# Patient Record
Sex: FEMALE | Race: WHITE | NOT HISPANIC OR LATINO | Employment: UNEMPLOYED | URBAN - NONMETROPOLITAN AREA
[De-identification: names, ages, dates, MRNs, and addresses within clinical notes are randomized per-mention and may not be internally consistent; named-entity substitution may affect disease eponyms.]

---

## 2020-07-02 ENCOUNTER — HOSPITAL ENCOUNTER (EMERGENCY)
Facility: OTHER | Age: 20
Discharge: HOME | End: 2020-07-02
Attending: EMERGENCY MEDICINE | Admitting: EMERGENCY MEDICINE
Payer: COMMERCIAL

## 2020-07-02 ENCOUNTER — APPOINTMENT (OUTPATIENT)
Dept: CT IMAGING | Facility: OTHER | Age: 20
End: 2020-07-02
Attending: EMERGENCY MEDICINE
Payer: COMMERCIAL

## 2020-07-02 VITALS
HEART RATE: 120 BPM | SYSTOLIC BLOOD PRESSURE: 130 MMHG | BODY MASS INDEX: 20.4 KG/M2 | TEMPERATURE: 98.9 F | HEIGHT: 67 IN | OXYGEN SATURATION: 100 % | RESPIRATION RATE: 16 BRPM | DIASTOLIC BLOOD PRESSURE: 88 MMHG | WEIGHT: 130 LBS

## 2020-07-02 DIAGNOSIS — N12 PYELONEPHRITIS: ICD-10-CM

## 2020-07-02 LAB
ALBUMIN UR-MCNC: NEGATIVE MG/DL
ANION GAP SERPL CALCULATED.3IONS-SCNC: 7 MMOL/L (ref 3–14)
APPEARANCE UR: CLEAR
BASOPHILS # BLD AUTO: 0 10E9/L (ref 0–0.2)
BASOPHILS NFR BLD AUTO: 0.3 %
BILIRUB UR QL STRIP: NEGATIVE
BUN SERPL-MCNC: 9 MG/DL (ref 7–25)
CALCIUM SERPL-MCNC: 9.8 MG/DL (ref 8.6–10.3)
CHLORIDE SERPL-SCNC: 102 MMOL/L (ref 98–107)
CO2 SERPL-SCNC: 28 MMOL/L (ref 21–31)
COLOR UR AUTO: ABNORMAL
CREAT SERPL-MCNC: 0.79 MG/DL (ref 0.6–1.2)
DIFFERENTIAL METHOD BLD: NORMAL
EOSINOPHIL # BLD AUTO: 0 10E9/L (ref 0–0.7)
EOSINOPHIL NFR BLD AUTO: 0.4 %
ERYTHROCYTE [DISTWIDTH] IN BLOOD BY AUTOMATED COUNT: 13.1 % (ref 10–15)
GFR SERPL CREATININE-BSD FRML MDRD: >90 ML/MIN/{1.73_M2}
GLUCOSE SERPL-MCNC: 96 MG/DL (ref 70–105)
GLUCOSE UR STRIP-MCNC: NEGATIVE MG/DL
HCG UR QL: NEGATIVE
HCT VFR BLD AUTO: 36.3 % (ref 35–47)
HGB BLD-MCNC: 12.1 G/DL (ref 11.7–15.7)
HGB UR QL STRIP: NEGATIVE
IMM GRANULOCYTES # BLD: 0 10E9/L (ref 0–0.4)
IMM GRANULOCYTES NFR BLD: 0.1 %
KETONES UR STRIP-MCNC: NEGATIVE MG/DL
LEUKOCYTE ESTERASE UR QL STRIP: ABNORMAL
LYMPHOCYTES # BLD AUTO: 3 10E9/L (ref 0.8–5.3)
LYMPHOCYTES NFR BLD AUTO: 44.7 %
MCH RBC QN AUTO: 28.5 PG (ref 26.5–33)
MCHC RBC AUTO-ENTMCNC: 33.3 G/DL (ref 31.5–36.5)
MCV RBC AUTO: 86 FL (ref 78–100)
MONOCYTES # BLD AUTO: 0.5 10E9/L (ref 0–1.3)
MONOCYTES NFR BLD AUTO: 8 %
MUCOUS THREADS #/AREA URNS LPF: PRESENT /LPF
NEUTROPHILS # BLD AUTO: 3.1 10E9/L (ref 1.6–8.3)
NEUTROPHILS NFR BLD AUTO: 46.5 %
NITRATE UR QL: NEGATIVE
PH UR STRIP: 6.5 PH (ref 5–7)
PLATELET # BLD AUTO: 195 10E9/L (ref 150–450)
POTASSIUM SERPL-SCNC: 3.5 MMOL/L (ref 3.5–5.1)
RBC # BLD AUTO: 4.24 10E12/L (ref 3.8–5.2)
RBC #/AREA URNS AUTO: 2 /HPF (ref 0–2)
SODIUM SERPL-SCNC: 137 MMOL/L (ref 134–144)
SOURCE: ABNORMAL
SP GR UR STRIP: 1.01 (ref 1–1.03)
SQUAMOUS #/AREA URNS AUTO: 3 /HPF (ref 0–1)
UROBILINOGEN UR STRIP-MCNC: NORMAL MG/DL (ref 0–2)
WBC # BLD AUTO: 6.7 10E9/L (ref 4–11)
WBC #/AREA URNS AUTO: 9 /HPF (ref 0–5)

## 2020-07-02 PROCEDURE — 96365 THER/PROPH/DIAG IV INF INIT: CPT | Performed by: EMERGENCY MEDICINE

## 2020-07-02 PROCEDURE — 99284 EMERGENCY DEPT VISIT MOD MDM: CPT | Mod: 25 | Performed by: EMERGENCY MEDICINE

## 2020-07-02 PROCEDURE — 81003 URINALYSIS AUTO W/O SCOPE: CPT | Performed by: EMERGENCY MEDICINE

## 2020-07-02 PROCEDURE — 81025 URINE PREGNANCY TEST: CPT | Performed by: EMERGENCY MEDICINE

## 2020-07-02 PROCEDURE — 25800030 ZZH RX IP 258 OP 636: Performed by: EMERGENCY MEDICINE

## 2020-07-02 PROCEDURE — 25000128 H RX IP 250 OP 636: Performed by: EMERGENCY MEDICINE

## 2020-07-02 PROCEDURE — 96375 TX/PRO/DX INJ NEW DRUG ADDON: CPT | Performed by: EMERGENCY MEDICINE

## 2020-07-02 PROCEDURE — 99283 EMERGENCY DEPT VISIT LOW MDM: CPT | Mod: Z6 | Performed by: EMERGENCY MEDICINE

## 2020-07-02 PROCEDURE — 85025 COMPLETE CBC W/AUTO DIFF WBC: CPT | Performed by: EMERGENCY MEDICINE

## 2020-07-02 PROCEDURE — 74176 CT ABD & PELVIS W/O CONTRAST: CPT | Mod: TC

## 2020-07-02 PROCEDURE — 36415 COLL VENOUS BLD VENIPUNCTURE: CPT | Performed by: EMERGENCY MEDICINE

## 2020-07-02 PROCEDURE — 80048 BASIC METABOLIC PNL TOTAL CA: CPT | Performed by: EMERGENCY MEDICINE

## 2020-07-02 RX ORDER — SODIUM CHLORIDE 9 MG/ML
1000 INJECTION, SOLUTION INTRAVENOUS CONTINUOUS
Status: DISCONTINUED | OUTPATIENT
Start: 2020-07-02 | End: 2020-07-02 | Stop reason: HOSPADM

## 2020-07-02 RX ORDER — ONDANSETRON 4 MG/1
4 TABLET, ORALLY DISINTEGRATING ORAL EVERY 8 HOURS PRN
Qty: 10 TABLET | Refills: 0 | Status: SHIPPED | OUTPATIENT
Start: 2020-07-02 | End: 2021-03-24

## 2020-07-02 RX ORDER — CEFDINIR 300 MG/1
300 CAPSULE ORAL 2 TIMES DAILY
Qty: 20 CAPSULE | Refills: 0 | Status: SHIPPED | OUTPATIENT
Start: 2020-07-02 | End: 2021-03-24

## 2020-07-02 RX ORDER — CLONAZEPAM 1 MG/1
2 TABLET ORAL DAILY
COMMUNITY
Start: 2018-10-23 | End: 2021-09-30

## 2020-07-02 RX ORDER — KETOROLAC TROMETHAMINE 15 MG/ML
15 INJECTION, SOLUTION INTRAMUSCULAR; INTRAVENOUS ONCE
Status: COMPLETED | OUTPATIENT
Start: 2020-07-02 | End: 2020-07-02

## 2020-07-02 RX ORDER — LEVOTHYROXINE SODIUM 75 UG/1
75 TABLET ORAL DAILY
COMMUNITY
Start: 2020-04-26 | End: 2021-09-30

## 2020-07-02 RX ORDER — ONDANSETRON 2 MG/ML
4 INJECTION INTRAMUSCULAR; INTRAVENOUS EVERY 30 MIN PRN
Status: DISCONTINUED | OUTPATIENT
Start: 2020-07-02 | End: 2020-07-02 | Stop reason: HOSPADM

## 2020-07-02 RX ORDER — CEFTRIAXONE SODIUM 1 G/50ML
1 INJECTION, SOLUTION INTRAVENOUS ONCE
Status: COMPLETED | OUTPATIENT
Start: 2020-07-02 | End: 2020-07-02

## 2020-07-02 RX ADMIN — KETOROLAC TROMETHAMINE 15 MG: 15 INJECTION, SOLUTION INTRAMUSCULAR; INTRAVENOUS at 01:59

## 2020-07-02 RX ADMIN — SODIUM CHLORIDE 1000 ML: 9 INJECTION, SOLUTION INTRAVENOUS at 01:57

## 2020-07-02 RX ADMIN — ONDANSETRON 4 MG: 2 INJECTION INTRAMUSCULAR; INTRAVENOUS at 01:59

## 2020-07-02 RX ADMIN — CEFTRIAXONE SODIUM 1 G: 1 INJECTION, SOLUTION INTRAVENOUS at 02:38

## 2020-07-02 ASSESSMENT — MIFFLIN-ST. JEOR: SCORE: 1392.31

## 2020-07-02 NOTE — ED PROVIDER NOTES
"[facility]  Emergency Department Visit Note    Abdominal Pain and Flank Pain      History of Present Illness     HPI:  Amy Mcgowan is a 19 year old female presenting to the Emergency Department today for evaluation of back pain and nausea without vomiting.  She hA had a history of frequent urinary tract infections in the past. Symptoms began 4 hours. Denies fever, chills, chest pain, abdominal pain, shortness of breath, hematuria, and abnormal vaginal discharge.    Medications:  Prior to Admission medications    Medication Sig Last Dose Taking? Auth Provider   cefdinir (OMNICEF) 300 MG capsule Take 1 capsule (300 mg) by mouth 2 times daily  Yes Christa Ferguson MD   clonazePAM (KLONOPIN) 1 MG tablet Take 2 mg by mouth daily  Yes Reported, Patient   levothyroxine (SYNTHROID/LEVOTHROID) 75 MCG tablet Take 75 mcg by mouth daily  Yes Reported, Patient   ondansetron (ZOFRAN ODT) 4 MG ODT tab Take 1 tablet (4 mg) by mouth every 8 hours as needed for nausea  Yes Christa Ferguson MD       Allergies:  Allergies   Allergen Reactions     Acetaminophen Hives and Other (See Comments)     States \"I have an allergic reaction\" hives  States \"I have an allergic reaction\" hives         Problem List:  There is no problem list on file for this patient.      Past Medical History:  History reviewed. No pertinent past medical history.    Past Surgical History:  History reviewed. No pertinent surgical history.    Social History:  Social History     Tobacco Use     Smoking status: Current Every Day Smoker     Tobacco comment: pack/week   Substance Use Topics     Alcohol use: None     Drug use: Yes     Types: Marijuana     Comment: everyday       Review of Systems:  10 point review of systems obtained and pertinent positive and negative findings noted in HPI. Review of systems otherwise negative.      Physical Exam     Vital signs: BP (!) 141/101   Pulse 120   Temp 99.4  F (37.4  C) (Tympanic)   Resp " "16   Ht 1.702 m (5' 7\")   Wt 59 kg (130 lb)   SpO2 100%   BMI 20.36 kg/m      Physical Exam:    General: awake, alert, uncomfortable  HEENT: no scleral injection, no nasal discharge, neck supple  Chest: non labored respirations, symmetric chest rise, no accessory muscle use  Cardiovascular: regular rate, regular rhythm, distally capillary refill intact  Abdomen: soft, nontender, no rebound or guarding, nondistended  Back: left costovertebral angle tenderness  Extremities: no deformities, edema, or cyanosis  Skin: warm, dry, no rashes  Neuro: alert, moving extremities x 4, ambulates without difficulty      Medical Decision Making & ED Course     Patient presents to the Emergency Department for evaluation of urinary symptoms with back pain and nausea. Differential includes urinary tract infection, hemorrhagic cystitis, pyelonephritis, urolithiasis, infected urolithiasis, pelvic inflammatory disease, sexually transmitted disease. Urinalysis suggests infection in the urinary tract which, with associated symptoms, suggests pyelonephritis. Sexually transmitted disease testing is not indicated at this time with lack of abnormal discharge and more likely diagnosis with consistent symptoms and laboratory findings. Will treat with cefdinir antibiotics, zofran as needed and close primary care provider follow up. She is stable for further outpatient management. Patient given instructions on follow-up and warning signs for which to return to emergency department, including worsening symptoms, intolerance to oral intake, or other concerns. All questions were answered and the patient is comfortable with plan for discharge. The patient was discharged in stable condition.     Diagnosis & Disposition     Diagnosis:  1. Pyelonephritis        Disposition:  Home    MD Phyllis Royal Theresa M, MD  07/02/20 0304    "

## 2020-07-02 NOTE — ED TRIAGE NOTES
Pt presents to ED with c/o lower mid-abdominal pain and left flank pain. Pt states pain began appx 3-4 hours ago, denies difficulty or pain with urination. States she has hx of frequent bladder infections, denies hx of kidney stones. Pt tearful, rates pain 10/10.  Shira Fagan RN

## 2020-07-02 NOTE — ED AVS SNAPSHOT
[facility]  [address]  [place] [address]  Phone:  [phone]  Fax:  [phone]                                    Amy Mcgowan   MRN: [id number]    Department:  [facility]   Date of Visit:  7/2/2020           After Visit Summary Signature Page    I have received my discharge instructions, and my questions have been answered. I have discussed any challenges I see with this plan with the nurse or doctor.    ..........................................................................................................................................  Patient/Patient Representative Signature      ..........................................................................................................................................  Patient Representative Print Name and Relationship to Patient    ..................................................               ................................................  Date                                   Time    ..........................................................................................................................................  Reviewed by Signature/Title    ...................................................              ..............................................  Date                                               Time          22EPIC Rev 08/18

## 2021-03-24 ENCOUNTER — HOSPITAL ENCOUNTER (EMERGENCY)
Facility: OTHER | Age: 21
Discharge: HOME | End: 2021-03-24
Attending: STUDENT IN AN ORGANIZED HEALTH CARE EDUCATION/TRAINING PROGRAM | Admitting: STUDENT IN AN ORGANIZED HEALTH CARE EDUCATION/TRAINING PROGRAM
Payer: COMMERCIAL

## 2021-03-24 VITALS
RESPIRATION RATE: 20 BRPM | OXYGEN SATURATION: 98 % | WEIGHT: 134.1 LBS | HEART RATE: 126 BPM | HEIGHT: 68 IN | BODY MASS INDEX: 20.32 KG/M2 | DIASTOLIC BLOOD PRESSURE: 96 MMHG | TEMPERATURE: 98.4 F | SYSTOLIC BLOOD PRESSURE: 143 MMHG

## 2021-03-24 DIAGNOSIS — L02.416 ABSCESS OF LEFT THIGH: ICD-10-CM

## 2021-03-24 DIAGNOSIS — L03.116 CELLULITIS OF LEFT THIGH: ICD-10-CM

## 2021-03-24 PROCEDURE — 250N000009 HC RX 250: Performed by: STUDENT IN AN ORGANIZED HEALTH CARE EDUCATION/TRAINING PROGRAM

## 2021-03-24 PROCEDURE — 99283 EMERGENCY DEPT VISIT LOW MDM: CPT | Mod: 25 | Performed by: STUDENT IN AN ORGANIZED HEALTH CARE EDUCATION/TRAINING PROGRAM

## 2021-03-24 PROCEDURE — 250N000013 HC RX MED GY IP 250 OP 250 PS 637: Performed by: STUDENT IN AN ORGANIZED HEALTH CARE EDUCATION/TRAINING PROGRAM

## 2021-03-24 PROCEDURE — 10060 I&D ABSCESS SIMPLE/SINGLE: CPT | Performed by: STUDENT IN AN ORGANIZED HEALTH CARE EDUCATION/TRAINING PROGRAM

## 2021-03-24 RX ORDER — CLONAZEPAM 2 MG/1
TABLET ORAL
COMMUNITY
Start: 2021-02-22 | End: 2021-09-30

## 2021-03-24 RX ORDER — CEPHALEXIN 500 MG/1
500 CAPSULE ORAL 4 TIMES DAILY
Qty: 28 CAPSULE | Refills: 0 | Status: SHIPPED | OUTPATIENT
Start: 2021-03-24 | End: 2021-03-31

## 2021-03-24 RX ORDER — DOXYCYCLINE 100 MG/1
100 CAPSULE ORAL 2 TIMES DAILY
Qty: 14 CAPSULE | Refills: 0 | Status: SHIPPED | OUTPATIENT
Start: 2021-03-24 | End: 2021-03-31

## 2021-03-24 RX ORDER — OXYCODONE HYDROCHLORIDE 5 MG/1
10 TABLET ORAL ONCE
Status: COMPLETED | OUTPATIENT
Start: 2021-03-24 | End: 2021-03-24

## 2021-03-24 RX ORDER — OXYCODONE HYDROCHLORIDE 5 MG/1
5 TABLET ORAL EVERY 6 HOURS PRN
Qty: 6 TABLET | Refills: 0 | Status: SHIPPED | OUTPATIENT
Start: 2021-03-24 | End: 2022-11-12

## 2021-03-24 RX ADMIN — LIDOCAINE HYDROCHLORIDE 5 ML: 10 INJECTION, SOLUTION EPIDURAL; INFILTRATION; INTRACAUDAL; PERINEURAL at 09:21

## 2021-03-24 RX ADMIN — OXYCODONE HYDROCHLORIDE 10 MG: 5 TABLET ORAL at 08:52

## 2021-03-24 ASSESSMENT — MIFFLIN-ST. JEOR: SCORE: 1413.83

## 2021-03-24 NOTE — ED TRIAGE NOTES
"ED Nursing Triage Note (General)   ________________________________    Amy Mcgowan is a 20 year old Female that presents to triage private car  With history of left leg pain, started a few days ago (4 days ago) and states she \"popped\" a pimple or ingrown hair and it grew, drained it and now it is growing again reported by patient   Significant symptoms had onset 4 day(s) ago.  BP (!) 143/96   Pulse 126   Temp 98.4  F (36.9  C) (Tympanic)   Resp 20   Ht 1.715 m (5' 7.5\")   Wt 60.8 kg (134 lb 1.6 oz)   SpO2 100%   Breastfeeding No   BMI 20.69 kg/m  t  Patient appears alert  and oriented, in no acute distress., and cooperative and calm behavior.  GCS Total = 15  Airway: intact  Breathing noted as Normal.  Circulation Normal  Skin normal, warm, area on back of left leg sore.   Action taken: brought back to [facility], friend with her. Alert, orientated, and ambulated (declined wheelchair ride).        [facility] lives with sister.   "

## 2021-03-24 NOTE — LETTER
[facility]  [address]  [place] [address]  [phone]          March 24, 2021    RE:  Amy Mcgowan                                                                                                                                                       [address] [place] [id number]            To whom it may concern:    Amy Mcgowan was seen in the emergency department today. Please excuse her from work until Saturday, March 27th due to a medical condition.    Sincerely,  Jesus Goldberg MD

## 2021-03-24 NOTE — DISCHARGE INSTRUCTIONS
You were found to have an infection of your leg and early abscess. This was incised and drained.    You will need to start taking antibiotics (keflex and doxycycline) as prescribed starting right away today. Keep taking as prescribed until gone even if infection goes away.    Take ibuprofen 400 mg every 6 hours as needed for pain.   Take oxycodone 5 mg every 6 hours as needed for pain not controlled by ibuprofen.    Keep the dressing clean and dry until follow-up.    Follow up in 2 days for a wound check and wick removal either with your primary doctor in clinic; call [facility] to schedule this appointment if you do not have a primary provider.    Come back to the emergency department if new fever greater than 101 degrees Fahrenheit, vomiting and inability to eat or drink, increasing redness or drainage of more pus, or any other acute concerns.

## 2021-03-24 NOTE — ED NOTES
MD used the lidocaine to numb the affected area on the patient's left posterior upper thigh/leg. Prior to start affected area was outlined and dated/timed. MD preceded with the procedure and assessed the area. Area was then dressed with dry gauze and kerlex. Patient tolerated procedure.

## 2021-03-25 PROBLEM — L29.3 PRURITUS OF GENITAL ORGANS: Status: ACTIVE | Noted: 2021-03-25

## 2021-03-25 PROBLEM — F34.1 DYSTHYMIC DISORDER: Status: ACTIVE | Noted: 2021-03-25

## 2021-03-25 PROBLEM — F81.9 LEARNING DISABILITY: Status: ACTIVE | Noted: 2021-03-25

## 2021-03-25 PROBLEM — H52.229 REGULAR ASTIGMATISM: Status: ACTIVE | Noted: 2018-01-24

## 2021-04-19 ENCOUNTER — APPOINTMENT (OUTPATIENT)
Dept: GENERAL RADIOLOGY | Facility: OTHER | Age: 21
End: 2021-04-19
Attending: FAMILY MEDICINE
Payer: COMMERCIAL

## 2021-04-19 ENCOUNTER — HOSPITAL ENCOUNTER (EMERGENCY)
Facility: OTHER | Age: 21
Discharge: HOME | End: 2021-04-19
Attending: FAMILY MEDICINE | Admitting: FAMILY MEDICINE
Payer: COMMERCIAL

## 2021-04-19 VITALS
BODY MASS INDEX: 19.62 KG/M2 | RESPIRATION RATE: 20 BRPM | HEART RATE: 87 BPM | OXYGEN SATURATION: 100 % | SYSTOLIC BLOOD PRESSURE: 146 MMHG | WEIGHT: 125 LBS | DIASTOLIC BLOOD PRESSURE: 86 MMHG | HEIGHT: 67 IN

## 2021-04-19 DIAGNOSIS — S80.02XA CONTUSION OF LEFT KNEE, INITIAL ENCOUNTER: ICD-10-CM

## 2021-04-19 DIAGNOSIS — S70.02XA CONTUSION OF LEFT HIP REGION: ICD-10-CM

## 2021-04-19 PROCEDURE — 99283 EMERGENCY DEPT VISIT LOW MDM: CPT | Performed by: FAMILY MEDICINE

## 2021-04-19 PROCEDURE — 73562 X-RAY EXAM OF KNEE 3: CPT | Mod: LT

## 2021-04-19 PROCEDURE — 250N000013 HC RX MED GY IP 250 OP 250 PS 637: Performed by: FAMILY MEDICINE

## 2021-04-19 PROCEDURE — 73502 X-RAY EXAM HIP UNI 2-3 VIEWS: CPT

## 2021-04-19 RX ORDER — IBUPROFEN 400 MG/1
800 TABLET, FILM COATED ORAL ONCE
Status: COMPLETED | OUTPATIENT
Start: 2021-04-19 | End: 2021-04-19

## 2021-04-19 RX ADMIN — IBUPROFEN 800 MG: 400 TABLET ORAL at 00:59

## 2021-04-19 ASSESSMENT — MIFFLIN-ST. JEOR: SCORE: 1364.63

## 2021-04-19 NOTE — ED TRIAGE NOTES
Patient arrives today via private car following a fall on tile floor at work, hitting left side shoulder, hip knee, and head. No loss of consciousness. Unable to bear weight on left leg, no numbness or tingling.   Carmen Burleson RN on 4/19/2021 at 12:41 AM

## 2021-04-19 NOTE — ED PROVIDER NOTES
"  History     Chief Complaint   Patient presents with     Fall     HPI  Amy Mcgowan is a 20 year old female with history of anxiety depression who was at work when she was carrying a tub of ice cream in a cardboard container and it had started melting on the bottom so it started to drip on the floor.  She did not realize that it it dripped and stepped into the ice cream and slipped and fell landing on her right knee hip and coming down on her left arm.  She states she hit her head but did not lose consciousness.  She  does not have any complaints of any other injuries.  She states her left elbow is mildly tender but she mostly complains of left hip knee and patellar pain.  No loss of consciousness.  She has difficulty walking secondary to the pain.  Denies numbness or tingling.    Allergies:  Allergies   Allergen Reactions     Acetaminophen Hives and Other (See Comments)     States \"I have an allergic reaction\" hives  States \"I have an allergic reaction\" hives       Aspirin Other (See Comments)     Her face became numb and her hands , also light headed . And became anxiouse  Fainting and hyperventalation         Problem List:    Patient Active Problem List    Diagnosis Date Noted     Dysthymic disorder 2021     Priority: Medium     Formatting of this note might be different from the original.  followed by [facility] in [place].  is [place]       Learning disability 2021     Priority: Medium     Formatting of this note might be different from the original.  with developmental delay       Pruritus of genital organs 2021     Priority: Medium     Formatting of this note might be different from the original.  thought to be secondary to hygiene practices       Regular astigmatism 2018     Priority: Medium     Acute cystitis 2015     Priority: Medium     Acute gastritis 2015     Priority: Medium     Alcohol affecting fetus or  via placenta or breast milk 2015    " " Priority: Medium     Chronic lymphocytic thyroiditis 05/29/2015     Priority: Medium     Contraceptive surveillance 05/29/2015     Priority: Medium     Custody issue 05/29/2015     Priority: Medium     Formatting of this note might be different from the original.  Custody papers on file, Legal Custodians [employer]aria Woodson       Exercise induced bronchospasm 05/29/2015     Priority: Medium     Hypothyroidism 05/29/2015     Priority: Medium     Goiter 05/29/2015     Priority: Medium     Nonspecific abnormal auditory function studies 05/29/2015     Priority: Medium     Vaginitis and vulvovaginitis 05/29/2015     Priority: Medium     Ganglion cyst 06/10/2009     Priority: Medium     Formatting of this note might be different from the original.  Right wrist          Past Medical History:    No past medical history on file.    Past Surgical History:    No past surgical history on file.    Family History:    No family history on file.    Social History:  Marital Status:  Single [1]  Social History     Tobacco Use     Smoking status: Current Every Day Smoker     Smokeless tobacco: Never Used     Tobacco comment: pack/week   Substance Use Topics     Alcohol use: Yes     Comment: OFF AND ON, RARE     Drug use: Yes     Types: Marijuana     Comment: everyday        Medications:    clonazePAM (KLONOPIN) 1 MG tablet  clonazePAM (KLONOPIN) 2 MG tablet  etonogestrel (NEXPLANON) 68 MG IMPL  levothyroxine (SYNTHROID/LEVOTHROID) 75 MCG tablet  oxyCODONE (ROXICODONE) 5 MG tablet      Review of Systems   Musculoskeletal: Positive for gait problem.   All other systems reviewed and are negative.      Physical Exam   BP: (!) 146/86  Pulse: 87  Resp: 20  Height: 170.2 cm (5' 7\")  Weight: 56.7 kg (125 lb)  SpO2: 100 %      Physical Exam  Vitals signs and nursing note reviewed.   Constitutional:       Appearance: Normal appearance.      Comments: Appears comfortable.   HENT:      Head: Normocephalic and atraumatic.   Neck:      " Musculoskeletal: Normal range of motion and neck supple.   Cardiovascular:      Rate and Rhythm: Normal rate and regular rhythm.      Heart sounds: Normal heart sounds.   Pulmonary:      Breath sounds: Normal breath sounds.   Musculoskeletal:         General: Tenderness present. No swelling or signs of injury.      Comments: Patient had some mild discomfort palpation just distal to her elbow.  She can flex and extend as well as rotate however.    She is acutely tender to palpation over her L  hip no obvious deformity is noted however.  She is also acutely tender over her left patella and there is appears to be some minor swelling.  I did not have her try to move her knee because of it.   Skin:     General: Skin is warm and dry.      Capillary Refill: Capillary refill takes less than 2 seconds.   Neurological:      Mental Status: She is alert.         ED Course   Patient seen and examined.  Ibuprofen 800 mg given orally.  Will have her get x-rays of her hip left knee and patella.  If these  are normal, then will do range of motion to see how she tolerates it.Ice applied to left hip and knee.     Xrays negative for fracture.     Patient was noted to have contusion of left hip region and left knee from a fall.  She will apply ice 15 minutes every 2-3 hours as needed for pain and use ibuprofen 600 to 800 tablets 3 times daily as needed for pain.  We will follow-up if any worsening of her symptoms.    Patient was comfortable with the plan.     Procedures      No results found for this or any previous visit (from the past 24 hour(s)).    Medications   ibuprofen (ADVIL/MOTRIN) tablet 800 mg (800 mg Oral Given 4/19/21 0059)       Assessments & Plan (with Medical Decision Making)     I have reviewed the nursing notes.    I have reviewed the findings, diagnosis, plan and need for follow up with the patient.    Discharge Medication List as of 4/19/2021  1:55 AM          Final diagnoses:   Contusion of left hip region    Contusion of left knee, initial encounter       4/19/2021   [facility]     Daisy Loaiza MD  04/19/21 1963       Daisy Loaiza MD  05/14/21 1218

## 2021-09-30 ENCOUNTER — OFFICE VISIT (OUTPATIENT)
Dept: FAMILY MEDICINE | Facility: OTHER | Age: 21
End: 2021-09-30
Attending: NURSE PRACTITIONER
Payer: COMMERCIAL

## 2021-09-30 VITALS
WEIGHT: 154.5 LBS | BODY MASS INDEX: 24.2 KG/M2 | OXYGEN SATURATION: 99 % | SYSTOLIC BLOOD PRESSURE: 128 MMHG | TEMPERATURE: 99.2 F | DIASTOLIC BLOOD PRESSURE: 72 MMHG | HEART RATE: 96 BPM | RESPIRATION RATE: 18 BRPM

## 2021-09-30 DIAGNOSIS — K04.7 DENTAL INFECTION: Primary | ICD-10-CM

## 2021-09-30 PROCEDURE — 99213 OFFICE O/P EST LOW 20 MIN: CPT | Performed by: NURSE PRACTITIONER

## 2021-09-30 PROCEDURE — G0463 HOSPITAL OUTPT CLINIC VISIT: HCPCS

## 2021-09-30 ASSESSMENT — PATIENT HEALTH QUESTIONNAIRE - PHQ9: SUM OF ALL RESPONSES TO PHQ QUESTIONS 1-9: 24

## 2021-09-30 ASSESSMENT — PAIN SCALES - GENERAL: PAINLEVEL: EXTREME PAIN (9)

## 2021-09-30 NOTE — LETTER
September 30, 2021        Amy Mcgowan  [address] [address]    To Whom it May Concern:    Amy Mcgowan was seen in our office on 9/30/2021 and was given the following instructions:  Patient may return to work on 10/1/21.    Sincerely,          Asya Wilson NP ..................9/30/2021 5:08 PM

## 2021-09-30 NOTE — PROGRESS NOTES
"ASSESSMENT/PLAN:  1. Dental infection    - amoxicillin-clavulanate (AUGMENTIN) 875-125 MG tablet; Take 1 tablet by mouth 2 times daily for 7 days  Dispense: 14 tablet; Refill: 0      Discussed with patient that she will be prescribed Augmentin BID x 7 days for a dental infection.     Apply a cold pack or ice compress for up to 20 minutes several times a day. This is to help reduce pain and relieve swelling. Cover it with a thin, dry cloth before putting it on your skin.    Rinse your mouth with warm saltwater several times per day. This will help reduce irritation, gum swelling, and pain.  Good oral hygiene is imperitive. Brush your at least twice a day. Use a fluoride toothpaste and soft-bristle toothbrush.  Eat a healthy diet that doesn t include many sugary foods and drinks.  Call [facility] to schedule an appointment to see a dentist.      Return to ED/UC if symptoms increase or concerns develop such as those discussed and listed on the \"When to go the Emergency Room\" portion of your discharge instructions.     May use over-the-counter ibuprofen PRN    Discussed warning signs/symptoms indicative of need to f/u    Follow up if symptoms persist or worsen or concerns      I explained my diagnostic considerations and recommendations to the patient, who voiced understanding and agreement with the treatment plan. All questions were answered. We discussed potential side effects of any prescribed or recommended therapies, as well as expectations for response to treatments.        HPI:    Amy Mcgowan is a 20 year old female  who presents to [facility] today for left upper and lower tooth pain. The patient states that she feels like she is having swelling to her left aspect of her neck and into her left side of her face. Temperature of 99.2 F during today's visit. The patient reports taking Ibuprofen. Symptoms started 2 days ago.     History reviewed. No pertinent past medical history.  History reviewed. No pertinent " "surgical history.  Social History     Tobacco Use     Smoking status: Current Every Day Smoker     Smokeless tobacco: Never Used     Tobacco comment: pack/week   Substance Use Topics     Alcohol use: Yes     Comment: OFF AND ON, RARE     Current Outpatient Medications   Medication Sig Dispense Refill     etonogestrel (NEXPLANON) 68 MG IMPL Inject 1 each Subcutaneous       oxyCODONE (ROXICODONE) 5 MG tablet Take 1 tablet (5 mg) by mouth every 6 hours as needed for breakthrough pain (Patient not taking: Reported on 9/30/2021) 6 tablet 0     Allergies   Allergen Reactions     Acetaminophen Hives, Other (See Comments) and Unknown     States \"I have an allergic reaction\" hives  States \"I have an allergic reaction\" hives    States \"I have an allergic reaction\" hives  States \"I have an allergic reaction\" hives  States \"I have an allergic reaction\" hives  States \"I have an allergic reaction\" hives     Aspirin Other (See Comments) and Unknown     Her face became numb and her hands , also light headed . And became anxiouse  Fainting and hyperventalation    Her face became numb and her hands , also light headed . And became anxiouse  Her face became numb and her hands , also light headed . And became anxiouse  Fainting and hyperventalation  Fainting and hyperventalation         Past medical history, past surgical history, current medications and allergies reviewed and accurate to the best of my knowledge.        ROS:  Refer to HPI    /72   Pulse 96   Temp 99.2  F (37.3  C) (Tympanic)   Resp 18   Wt 70.1 kg (154 lb 8 oz)   LMP 09/15/2021 (Within Days)   SpO2 99%   BMI 24.20 kg/m      EXAM:  General Appearance: Well appearing female, appropriate appearance for age. No acute distress  Ears: Left TM intact, translucent with bony landmarks appreciated, no erythema, no effusion, no bulging, no purulence.  Right TM intact, translucent with bony landmarks appreciated, no erythema, no effusion, no bulging, no purulence.  " Left auditory canal clear.  Right auditory canal clear.  Normal external ears, non tender.  Orophayrnx: moist mucous membranes, posterior pharynx without erythema, tonsils without hypertrophy, no erythema, no exudates or petechiae, no post nasal drip seen, no trismus, voice clear. Erythema and swelling of the left upper gingiva posterior to the last molar.   Psychological: normal affect, alert, oriented, and pleasant.

## 2021-09-30 NOTE — NURSING NOTE
"Chief Complaint   Patient presents with     Throat Pain     Patient is here for swelling of her neck and pain in her mouth, throat and neck that started 2 days ago. Patient is concerned she has an abscessed tooth.     Initial /72   Pulse 96   Temp 99.2  F (37.3  C) (Tympanic)   Resp 18   Wt 70.1 kg (154 lb 8 oz)   LMP 09/15/2021 (Within Days)   SpO2 99%   BMI 24.20 kg/m   Estimated body mass index is 24.2 kg/m  as calculated from the following:    Height as of 4/19/21: 1.702 m (5' 7\").    Weight as of this encounter: 70.1 kg (154 lb 8 oz).  Medication Reconciliation: complete    Olesya Macdonald LPN  "

## 2021-09-30 NOTE — PATIENT INSTRUCTIONS
"Apply a cold pack or ice compress for up to 20 minutes several times a day. This is to help reduce pain and relieve swelling. Cover it with a thin, dry cloth before putting it on your skin.    Rinse your mouth with warm saltwater several times per day. This will help reduce irritation, gum swelling, and pain.  Good oral hygiene is imperitive. Brush your at least twice a day. Use a fluoride toothpaste and soft-bristle toothbrush.  Eat a healthy diet that doesn t include many sugary foods and drinks.  Call ASAP to schedule an appointment to see a dentist.      Return to ED/UC if symptoms increase or concerns develop such as those discussed and listed on the \"When to go the Emergency Room\" portion of your discharge instructions.       "

## 2022-02-21 ENCOUNTER — TRANSFERRED RECORDS (OUTPATIENT)
Dept: MULTI SPECIALTY CLINIC | Facility: CLINIC | Age: 22
End: 2022-02-21

## 2022-02-21 LAB
C TRACH DNA SPEC QL PROBE+SIG AMP: NEGATIVE
HEP C HIM: NORMAL
HIV 1&2 EXT: NORMAL
N GONORRHOEA DNA SPEC QL PROBE+SIG AMP: NEGATIVE
PAP-ABSTRACT: NORMAL
SPECIMEN DESCRIP: NORMAL
SPECIMEN DESCRIPTION: NORMAL
TSH SERPL-ACNC: 2.05 UIU/ML (ref 0.4–3.99)

## 2022-09-19 ENCOUNTER — OFFICE VISIT (OUTPATIENT)
Dept: FAMILY MEDICINE | Facility: OTHER | Age: 22
End: 2022-09-19
Attending: NURSE PRACTITIONER
Payer: COMMERCIAL

## 2022-09-19 VITALS
TEMPERATURE: 98.9 F | DIASTOLIC BLOOD PRESSURE: 84 MMHG | OXYGEN SATURATION: 99 % | BODY MASS INDEX: 27.03 KG/M2 | WEIGHT: 172.6 LBS | RESPIRATION RATE: 16 BRPM | HEART RATE: 100 BPM | SYSTOLIC BLOOD PRESSURE: 130 MMHG

## 2022-09-19 DIAGNOSIS — R22.0 LEFT FACIAL SWELLING: ICD-10-CM

## 2022-09-19 DIAGNOSIS — K08.89 PAIN, DENTAL: ICD-10-CM

## 2022-09-19 DIAGNOSIS — K04.7 DENTAL ABSCESS: Primary | ICD-10-CM

## 2022-09-19 PROCEDURE — G0463 HOSPITAL OUTPT CLINIC VISIT: HCPCS

## 2022-09-19 PROCEDURE — 99213 OFFICE O/P EST LOW 20 MIN: CPT | Performed by: NURSE PRACTITIONER

## 2022-09-19 RX ORDER — METRONIDAZOLE 500 MG/1
500 TABLET ORAL 2 TIMES DAILY
Qty: 14 TABLET | Refills: 0 | Status: SHIPPED | OUTPATIENT
Start: 2022-09-19 | End: 2022-09-26

## 2022-09-19 RX ORDER — LAMOTRIGINE 25 MG/1
TABLET ORAL
COMMUNITY
Start: 2021-10-07 | End: 2022-11-12

## 2022-09-19 RX ORDER — NITROFURANTOIN 25; 75 MG/1; MG/1
CAPSULE ORAL
COMMUNITY
Start: 2022-04-27 | End: 2022-11-12

## 2022-09-19 RX ORDER — IBUPROFEN 800 MG/1
800 TABLET, FILM COATED ORAL EVERY 8 HOURS PRN
Qty: 30 TABLET | Refills: 0 | Status: SHIPPED | OUTPATIENT
Start: 2022-09-19

## 2022-09-19 RX ORDER — HYDROXYZINE PAMOATE 25 MG/1
CAPSULE ORAL
COMMUNITY
Start: 2021-10-07 | End: 2022-11-12

## 2022-09-19 RX ORDER — METRONIDAZOLE 500 MG/1
TABLET ORAL
COMMUNITY
Start: 2022-08-12 | End: 2022-11-12

## 2022-09-19 RX ORDER — LEVOTHYROXINE SODIUM 88 UG/1
88 TABLET ORAL DAILY
COMMUNITY
Start: 2022-07-13 | End: 2022-11-12

## 2022-09-19 ASSESSMENT — PATIENT HEALTH QUESTIONNAIRE - PHQ9: SUM OF ALL RESPONSES TO PHQ QUESTIONS 1-9: 24

## 2022-09-19 ASSESSMENT — PAIN SCALES - GENERAL: PAINLEVEL: WORST PAIN (10)

## 2022-09-19 NOTE — NURSING NOTE
"Chief Complaint   Patient presents with     Dental Pain         Initial /84 (BP Location: Right arm, Patient Position: Sitting, Cuff Size: Adult Regular)   Pulse 100   Temp 98.9  F (37.2  C) (Tympanic)   Resp 16   Wt 78.3 kg (172 lb 9.6 oz)   LMP  (LMP Unknown)   SpO2 99%   BMI 27.03 kg/m   Estimated body mass index is 27.03 kg/m  as calculated from the following:    Height as of 4/19/21: 1.702 m (5' 7\").    Weight as of this encounter: 78.3 kg (172 lb 9.6 oz).  Medication Reconciliation: complete    Chelle Moore LPN  "

## 2022-09-19 NOTE — PROGRESS NOTES
ASSESSMENT/PLAN:     I have reviewed the nursing notes.  I have reviewed the findings, diagnosis, plan and need for follow up with the patient.      1. Dental abscess    - amoxicillin-clavulanate (AUGMENTIN) 875-125 MG tablet; Take 1 tablet by mouth 2 times daily for 10 days  Dispense: 20 tablet; Refill: 0  - metroNIDAZOLE (FLAGYL) 500 MG tablet; Take 1 tablet (500 mg) by mouth 2 times daily for 7 days  Dispense: 14 tablet; Refill: 0    Recommend follow up with dentist as soon as possible    2. Pain, dental    - ibuprofen (ADVIL/MOTRIN) 800 MG tablet; Take 1 tablet (800 mg) by mouth every 8 hours as needed for moderate pain  Dispense: 30 tablet; Refill: 0    3. Left facial swelling    - ibuprofen (ADVIL/MOTRIN) 800 MG tablet; Take 1 tablet (800 mg) by mouth every 8 hours as needed for moderate pain  Dispense: 30 tablet; Refill: 0        I explained my diagnostic considerations and recommendations to the patient, who voiced understanding and agreement with the treatment plan. All questions were answered. We discussed potential side effects of any prescribed or recommended therapies, as well as expectations for response to treatments.    Lillian Vasquez NP  [facility] AND [facility]      SUBJECTIVE:   Amy Mcgowan is a 21 year old female who presents to clinic today for the following health issues:  Dental infection with facial swelling    HPI  Left sided lower dental pain for the past few days.  Left sided facial swelling started yesterday.  Chronic broken tooth on left lower side.  Foul taste in mouth.  Hot and cold sensitivity.  Unable to chew on left side.  No fevers.   Mild left ear pain started today.  Left side of neck with pain.   No headaches.  Appetite decreased due to pain.  No sore throat.  No cough or breathing difficulty.    Using ice.    Taking Ibuprofen 600 mg 3 times yesterday, none today.        No past medical history on file.  No past surgical history on file.  Social History  "    Tobacco Use     Smoking status: Current Every Day Smoker     Packs/day: 1.00     Types: Cigars     Smokeless tobacco: Never Used     Tobacco comment: pack/week   Substance Use Topics     Alcohol use: Yes     Comment: OFF AND ON, RARE     Current Outpatient Medications   Medication Sig Dispense Refill     etonogestrel (NEXPLANON) 68 MG IMPL Inject 1 each Subcutaneous       levothyroxine (SYNTHROID/LEVOTHROID) 88 MCG tablet Take 88 mcg by mouth daily       hydrOXYzine (VISTARIL) 25 MG capsule TAKE 1 CAPSULE BY MOUTH THREE TIMES DAILY AS NEEDED (Patient not taking: Reported on 9/19/2022)       lamoTRIgine (LAMICTAL) 25 MG tablet TAKE 1 TABLET BY MOUTH EVERY DAY AS DIRECTED (Patient not taking: Reported on 9/19/2022)       metroNIDAZOLE (FLAGYL) 500 MG tablet  (Patient not taking: Reported on 9/19/2022)       nitroFURantoin macrocrystal-monohydrate (MACROBID) 100 MG capsule TAKE 1 CAPSULE BY MOUTH TWICE DAILY WITH MEALS AND WITH FOOD OR MILK FOR 5 DAYS FOR INFECTION (Patient not taking: Reported on 9/19/2022)       oxyCODONE (ROXICODONE) 5 MG tablet Take 1 tablet (5 mg) by mouth every 6 hours as needed for breakthrough pain (Patient not taking: No sig reported) 6 tablet 0     Allergies   Allergen Reactions     Acetaminophen Hives, Other (See Comments) and Unknown     States \"I have an allergic reaction\" hives  States \"I have an allergic reaction\" hives    States \"I have an allergic reaction\" hives  States \"I have an allergic reaction\" hives  States \"I have an allergic reaction\" hives  States \"I have an allergic reaction\" hives     Aspirin Other (See Comments) and Unknown     Her face became numb and her hands , also light headed . And became anxiouse  Fainting and hyperventalation    Her face became numb and her hands , also light headed . And became anxiouse  Her face became numb and her hands , also light headed . And became anxiouse  Fainting and hyperventalation  Fainting and hyperventalation         Past " medical history, past surgical history, current medications and allergies reviewed and accurate to the best of my knowledge.        OBJECTIVE:     /84 (BP Location: Right arm, Patient Position: Sitting, Cuff Size: Adult Regular)   Pulse 100   Temp 98.9  F (37.2  C) (Tympanic)   Resp 16   Wt 78.3 kg (172 lb 9.6 oz)   LMP  (LMP Unknown)   SpO2 99%   BMI 27.03 kg/m    Body mass index is 27.03 kg/m .     Physical Exam  General Appearance: Well appearing adult female, appropriate appearance for age. No acute distress  Ears: Left TM intact with bony landmarks appreciated, no erythema, no effusion, no bulging, no purulence.  Right TM intact with bony landmarks appreciated, no erythema, no effusion, no bulging, no purulence.  Left auditory canal clear without drainage or bleeding.  Right auditory canal clear without drainage or bleeding.  Normal external ears, non tender.  Orophayrnx: moist mucous membranes, pharynx without erythema, tonsils without hypertrophy, tonsils without erythema, no tonsillar exudates, no oral lesions, no palate petechiae, no post nasal drip seen, no trismus, voice clear.  Left lower molar with chipped appearance at gum line with chronic filling in place on top of tooth, surrounding gum tissue with erythema, mild swelling, no visible pustule or abscess of gum tissue, area is very tender to light touch.  Left jaw with significant swelling and tenderness to palpation.    Nose:  No noted drainage or congestion   Neck: left sided tenderness without lymph node enlargement   Respiratory: normal chest wall and respirations.  Normal effort.  No cough appreciated.  Cardiac: RRR with no murmurs   Musculoskeletal:  Equal movement of bilateral upper extremities.  Equal movement of bilateral lower extremities.  Normal gait.    Dermatological: facial skin intact, no hives or rash  Psychological: normal affect, alert, oriented, and pleasant.

## 2022-11-04 ENCOUNTER — HOSPITAL ENCOUNTER (EMERGENCY)
Facility: OTHER | Age: 22
Discharge: HOME | End: 2022-11-04
Attending: PHYSICIAN ASSISTANT | Admitting: FAMILY MEDICINE
Payer: MEDICAID

## 2022-11-04 ENCOUNTER — APPOINTMENT (OUTPATIENT)
Dept: CT IMAGING | Facility: OTHER | Age: 22
End: 2022-11-04
Attending: FAMILY MEDICINE
Payer: MEDICAID

## 2022-11-04 VITALS
SYSTOLIC BLOOD PRESSURE: 145 MMHG | WEIGHT: 172 LBS | DIASTOLIC BLOOD PRESSURE: 95 MMHG | RESPIRATION RATE: 16 BRPM | HEIGHT: 67 IN | HEART RATE: 80 BPM | BODY MASS INDEX: 27 KG/M2 | TEMPERATURE: 98.5 F | OXYGEN SATURATION: 100 %

## 2022-11-04 DIAGNOSIS — N12 PYELONEPHRITIS: ICD-10-CM

## 2022-11-04 LAB
ALBUMIN SERPL-MCNC: 4 G/DL (ref 3.5–5.7)
ALBUMIN UR-MCNC: NEGATIVE MG/DL
ALP SERPL-CCNC: 58 U/L (ref 34–104)
ALT SERPL W P-5'-P-CCNC: 6 U/L (ref 7–52)
ANION GAP SERPL CALCULATED.3IONS-SCNC: 8 MMOL/L (ref 3–14)
APPEARANCE UR: CLEAR
AST SERPL W P-5'-P-CCNC: 9 U/L (ref 13–39)
BACTERIA #/AREA URNS HPF: ABNORMAL /HPF
BASOPHILS # BLD AUTO: 0 10E3/UL (ref 0–0.2)
BASOPHILS NFR BLD AUTO: 0 %
BILIRUB SERPL-MCNC: 0.4 MG/DL (ref 0.3–1)
BILIRUB UR QL STRIP: NEGATIVE
BUN SERPL-MCNC: 8 MG/DL (ref 7–25)
C TRACH DNA SPEC QL PROBE+SIG AMP: NEGATIVE
CALCIUM SERPL-MCNC: 9.5 MG/DL (ref 8.6–10.3)
CHLORIDE BLD-SCNC: 104 MMOL/L (ref 98–107)
CO2 SERPL-SCNC: 25 MMOL/L (ref 21–31)
COLOR UR AUTO: ABNORMAL
CREAT SERPL-MCNC: 0.75 MG/DL (ref 0.6–1.2)
EOSINOPHIL # BLD AUTO: 0 10E3/UL (ref 0–0.7)
EOSINOPHIL NFR BLD AUTO: 0 %
ERYTHROCYTE [DISTWIDTH] IN BLOOD BY AUTOMATED COUNT: 12.9 % (ref 10–15)
ETHANOL SERPL-MCNC: <0.01 G/DL
GFR SERPL CREATININE-BSD FRML MDRD: >90 ML/MIN/1.73M2
GLUCOSE BLD-MCNC: 91 MG/DL (ref 70–105)
GLUCOSE UR STRIP-MCNC: NEGATIVE MG/DL
HCG UR QL: NEGATIVE
HCT VFR BLD AUTO: 36.5 % (ref 35–47)
HGB BLD-MCNC: 12.4 G/DL (ref 11.7–15.7)
HGB UR QL STRIP: ABNORMAL
HOLD SPECIMEN: NORMAL
HYALINE CASTS: 1 /LPF
IMM GRANULOCYTES # BLD: 0 10E3/UL
IMM GRANULOCYTES NFR BLD: 0 %
KETONES UR STRIP-MCNC: ABNORMAL MG/DL
LACTATE SERPL-SCNC: 0.8 MMOL/L (ref 0.7–2)
LEUKOCYTE ESTERASE UR QL STRIP: ABNORMAL
LIPASE SERPL-CCNC: 6 U/L (ref 11–82)
LYMPHOCYTES # BLD AUTO: 1.7 10E3/UL (ref 0.8–5.3)
LYMPHOCYTES NFR BLD AUTO: 15 %
MCH RBC QN AUTO: 28.6 PG (ref 26.5–33)
MCHC RBC AUTO-ENTMCNC: 34 G/DL (ref 31.5–36.5)
MCV RBC AUTO: 84 FL (ref 78–100)
MONOCYTES # BLD AUTO: 0.9 10E3/UL (ref 0–1.3)
MONOCYTES NFR BLD AUTO: 8 %
MUCOUS THREADS #/AREA URNS LPF: PRESENT /LPF
N GONORRHOEA DNA SPEC QL NAA+PROBE: NEGATIVE
NEUTROPHILS # BLD AUTO: 9.2 10E3/UL (ref 1.6–8.3)
NEUTROPHILS NFR BLD AUTO: 77 %
NITRATE UR QL: NEGATIVE
NRBC # BLD AUTO: 0 10E3/UL
NRBC BLD AUTO-RTO: 0 /100
PH UR STRIP: 5.5 [PH] (ref 5–9)
PLATELET # BLD AUTO: 271 10E3/UL (ref 150–450)
POTASSIUM BLD-SCNC: 4.2 MMOL/L (ref 3.5–5.1)
PROT SERPL-MCNC: 7.3 G/DL (ref 6.4–8.9)
RBC # BLD AUTO: 4.34 10E6/UL (ref 3.8–5.2)
RBC URINE: 5 /HPF
SODIUM SERPL-SCNC: 137 MMOL/L (ref 134–144)
SP GR UR STRIP: 1.01 (ref 1–1.03)
SQUAMOUS EPITHELIAL: 8 /HPF
UROBILINOGEN UR STRIP-MCNC: NORMAL MG/DL
WBC # BLD AUTO: 11.9 10E3/UL (ref 4–11)
WBC URINE: 26 /HPF

## 2022-11-04 PROCEDURE — 250N000011 HC RX IP 250 OP 636: Performed by: FAMILY MEDICINE

## 2022-11-04 PROCEDURE — 250N000013 HC RX MED GY IP 250 OP 250 PS 637: Performed by: FAMILY MEDICINE

## 2022-11-04 PROCEDURE — 83605 ASSAY OF LACTIC ACID: CPT | Performed by: FAMILY MEDICINE

## 2022-11-04 PROCEDURE — 99285 EMERGENCY DEPT VISIT HI MDM: CPT | Mod: 25 | Performed by: FAMILY MEDICINE

## 2022-11-04 PROCEDURE — 81001 URINALYSIS AUTO W/SCOPE: CPT | Performed by: FAMILY MEDICINE

## 2022-11-04 PROCEDURE — 81001 URINALYSIS AUTO W/SCOPE: CPT | Performed by: PHYSICIAN ASSISTANT

## 2022-11-04 PROCEDURE — 96374 THER/PROPH/DIAG INJ IV PUSH: CPT | Mod: XU | Performed by: FAMILY MEDICINE

## 2022-11-04 PROCEDURE — 81025 URINE PREGNANCY TEST: CPT | Performed by: FAMILY MEDICINE

## 2022-11-04 PROCEDURE — 87186 SC STD MICRODIL/AGAR DIL: CPT | Performed by: PHYSICIAN ASSISTANT

## 2022-11-04 PROCEDURE — 36415 COLL VENOUS BLD VENIPUNCTURE: CPT | Performed by: FAMILY MEDICINE

## 2022-11-04 PROCEDURE — 96375 TX/PRO/DX INJ NEW DRUG ADDON: CPT | Performed by: FAMILY MEDICINE

## 2022-11-04 PROCEDURE — 87491 CHLMYD TRACH DNA AMP PROBE: CPT | Performed by: FAMILY MEDICINE

## 2022-11-04 PROCEDURE — 96376 TX/PRO/DX INJ SAME DRUG ADON: CPT | Performed by: FAMILY MEDICINE

## 2022-11-04 PROCEDURE — 82077 ASSAY SPEC XCP UR&BREATH IA: CPT | Performed by: FAMILY MEDICINE

## 2022-11-04 PROCEDURE — 99283 EMERGENCY DEPT VISIT LOW MDM: CPT | Performed by: FAMILY MEDICINE

## 2022-11-04 PROCEDURE — 85025 COMPLETE CBC W/AUTO DIFF WBC: CPT | Performed by: FAMILY MEDICINE

## 2022-11-04 PROCEDURE — 87591 N.GONORRHOEAE DNA AMP PROB: CPT | Performed by: FAMILY MEDICINE

## 2022-11-04 PROCEDURE — 83690 ASSAY OF LIPASE: CPT | Performed by: FAMILY MEDICINE

## 2022-11-04 PROCEDURE — 80053 COMPREHEN METABOLIC PANEL: CPT | Performed by: FAMILY MEDICINE

## 2022-11-04 PROCEDURE — 74177 CT ABD & PELVIS W/CONTRAST: CPT

## 2022-11-04 RX ORDER — CIPROFLOXACIN 500 MG/1
500 TABLET, FILM COATED ORAL ONCE
Status: COMPLETED | OUTPATIENT
Start: 2022-11-04 | End: 2022-11-04

## 2022-11-04 RX ORDER — IOPAMIDOL 755 MG/ML
90 INJECTION, SOLUTION INTRAVASCULAR ONCE
Status: COMPLETED | OUTPATIENT
Start: 2022-11-04 | End: 2022-11-04

## 2022-11-04 RX ORDER — KETOROLAC TROMETHAMINE 30 MG/ML
30 INJECTION, SOLUTION INTRAMUSCULAR; INTRAVENOUS ONCE
Status: COMPLETED | OUTPATIENT
Start: 2022-11-04 | End: 2022-11-04

## 2022-11-04 RX ORDER — ONDANSETRON 2 MG/ML
4 INJECTION INTRAMUSCULAR; INTRAVENOUS
Status: DISCONTINUED | OUTPATIENT
Start: 2022-11-04 | End: 2022-11-04 | Stop reason: HOSPADM

## 2022-11-04 RX ORDER — CIPROFLOXACIN 500 MG/1
500 TABLET, FILM COATED ORAL 2 TIMES DAILY
Qty: 14 TABLET | Refills: 0 | Status: SHIPPED | OUTPATIENT
Start: 2022-11-04 | End: 2022-11-12

## 2022-11-04 RX ORDER — OXYCODONE HYDROCHLORIDE 5 MG/1
5 TABLET ORAL EVERY 6 HOURS PRN
Qty: 5 TABLET | Refills: 0 | Status: SHIPPED | OUTPATIENT
Start: 2022-11-04 | End: 2022-11-09

## 2022-11-04 RX ADMIN — HYDROMORPHONE HYDROCHLORIDE 1 MG: 1 INJECTION, SOLUTION INTRAMUSCULAR; INTRAVENOUS; SUBCUTANEOUS at 16:29

## 2022-11-04 RX ADMIN — HYDROMORPHONE HYDROCHLORIDE 1 MG: 1 INJECTION, SOLUTION INTRAMUSCULAR; INTRAVENOUS; SUBCUTANEOUS at 18:16

## 2022-11-04 RX ADMIN — IOPAMIDOL 90 ML: 755 INJECTION, SOLUTION INTRAVENOUS at 17:36

## 2022-11-04 RX ADMIN — CIPROFLOXACIN 500 MG: 500 TABLET, FILM COATED ORAL at 18:23

## 2022-11-04 RX ADMIN — HYDROMORPHONE HYDROCHLORIDE 1 MG: 1 INJECTION, SOLUTION INTRAMUSCULAR; INTRAVENOUS; SUBCUTANEOUS at 14:48

## 2022-11-04 RX ADMIN — ONDANSETRON 4 MG: 2 INJECTION INTRAMUSCULAR; INTRAVENOUS at 14:47

## 2022-11-04 RX ADMIN — KETOROLAC TROMETHAMINE 30 MG: 30 INJECTION, SOLUTION INTRAMUSCULAR at 14:48

## 2022-11-04 ASSESSMENT — ENCOUNTER SYMPTOMS
ABDOMINAL PAIN: 1
FLANK PAIN: 1

## 2022-11-04 ASSESSMENT — ACTIVITIES OF DAILY LIVING (ADL)
ADLS_ACUITY_SCORE: 35
ADLS_ACUITY_SCORE: 35

## 2022-11-04 NOTE — PROGRESS NOTES
IV Contrast- Discharge Instructions After Your CT Scan      The IV contrast you received today will be filtered from your bloodstream by your kidneys during the next 24 hours and pass from the body in urine.  You will not be aware of this process and your urine will not change in color.  To help this process you should drink at least 4 additional glasses of water or juice today.  This reduces stress on your kidneys.    Most contrast reactions are immediate.  Should you develop symptoms of concern after discharge, contact the department at the number below.  After hours you should contact your personal physician.  If you develop breathing distress or wheezing, call 911.    1.  Has the patient had a previous reaction to IV contrast? no    2.  Does the patient have kidney disease? no    3.  Is the patient on dialysis? no    If YES to any of these questions, exam will be reviewed with a Radiologist before administering contrast.      Discussed possible need for split tri-phase urogram with Orvedahl, he ok'd just single phase with contrast as sufficient. lizeth

## 2022-11-04 NOTE — ED PROVIDER NOTES
"  History     Chief Complaint   Patient presents with     Flank Pain     The history is provided by the patient.     Amy Mcgowan is a 21 year old female here with bilateral flank pain which has become left sided and now radiates around to the front of her abdomen on the left side. She has quite a bit of pain on the left side of her back.  She has been drinking a lot lately for the past several weeks and now is trying to stop but is having withdrawal symptoms. She has been drinking shots a little each day to prevent alcohol withdrawal. She has had unprotected sex and is concerned about STD's.    Allergies:  Allergies   Allergen Reactions     Acetaminophen Hives, Other (See Comments) and Unknown     States \"I have an allergic reaction\" hives  States \"I have an allergic reaction\" hives    States \"I have an allergic reaction\" hives  States \"I have an allergic reaction\" hives  States \"I have an allergic reaction\" hives  States \"I have an allergic reaction\" hives     Aspirin Other (See Comments) and Unknown     Her face became numb and her hands , also light headed . And became anxiouse  Fainting and hyperventalation    Her face became numb and her hands , also light headed . And became anxiouse  Her face became numb and her hands , also light headed . And became anxiouse  Fainting and hyperventalation  Fainting and hyperventalation       Problem List:    Patient Active Problem List    Diagnosis Date Noted     Dysthymic disorder 03/25/2021     Priority: Medium     Formatting of this note might be different from the original.  followed by [facility] in [place].  [place]       Learning disability 03/25/2021     Priority: Medium     Formatting of this note might be different from the original.  with developmental delay       Pruritus of genital organs 03/25/2021     Priority: Medium     Formatting of this note might be different from the original.  thought to be secondary to hygiene practices       Regular " astigmatism 2018     Priority: Medium     Acute cystitis 2015     Priority: Medium     Acute gastritis 2015     Priority: Medium     Alcohol affecting fetus or  via placenta or breast milk 2015     Priority: Medium     Chronic lymphocytic thyroiditis 2015     Priority: Medium     Contraceptive surveillance 2015     Priority: Medium     Custody issue 2015     Priority: Medium     Formatting of this note might be different from the original.  Custody papers on file, Legal Cust[employer]       Exercise induced bronchospasm 2015     Priority: Medium     Hypothyroidism 2015     Priority: Medium     Goiter 2015     Priority: Medium     Nonspecific abnormal auditory function studies 2015     Priority: Medium     Vaginitis and vulvovaginitis 2015     Priority: Medium     Ganglion cyst 06/10/2009     Priority: Medium     Formatting of this note might be different from the original.  Right wrist          Past Medical History:    No past medical history on file.    Past Surgical History:    No past surgical history on file.    Family History:    No family history on file.    Social History:  Marital Status:  Single [1]  Social History     Tobacco Use     Smoking status: Every Day     Packs/day: 1.00     Types: Cigars, Cigarettes     Smokeless tobacco: Never     Tobacco comments:     pack/week   Vaping Use     Vaping Use: Never used   Substance Use Topics     Alcohol use: Yes     Comment: OFF AND ON, RARE     Drug use: Not Currently     Types: Marijuana     Comment: everyday        Medications:    ciprofloxacin (CIPRO) 500 MG tablet  oxyCODONE (ROXICODONE) 5 MG tablet  etonogestrel (NEXPLANON) 68 MG IMPL  hydrOXYzine (VISTARIL) 25 MG capsule  ibuprofen (ADVIL/MOTRIN) 800 MG tablet  lamoTRIgine (LAMICTAL) 25 MG tablet  levothyroxine (SYNTHROID/LEVOTHROID) 88 MCG tablet  metroNIDAZOLE (FLAGYL) 500 MG tablet  nitroFURantoin  "macrocrystal-monohydrate (MACROBID) 100 MG capsule  oxyCODONE (ROXICODONE) 5 MG tablet      Review of Systems   Gastrointestinal: Positive for abdominal pain.   Genitourinary: Positive for flank pain.   All other systems reviewed and are negative.      Physical Exam   BP: (!) 145/95  Pulse: 80  Temp: 98.5  F (36.9  C)  Resp: 16  Height: 170.2 cm (5' 7\")  Weight: 78 kg (172 lb)  SpO2: 100 %      Physical Exam  Vitals and nursing note reviewed.   Constitutional:       General: She is not in acute distress.     Appearance: Normal appearance. She is not ill-appearing.   Cardiovascular:      Rate and Rhythm: Normal rate and regular rhythm.      Pulses: Normal pulses.      Heart sounds: Normal heart sounds. No murmur heard.  Pulmonary:      Effort: Pulmonary effort is normal. No respiratory distress.      Breath sounds: Normal breath sounds.   Abdominal:      General: Bowel sounds are normal. There is no distension.      Tenderness: There is no abdominal tenderness. There is left CVA tenderness. There is no guarding or rebound.   Skin:     General: Skin is warm and dry.      Findings: No erythema or rash.      Comments: No tenderness or pain with light touch   Neurological:      General: No focal deficit present.      Mental Status: She is alert and oriented to person, place, and time.         Results for orders placed or performed during the hospital encounter of 11/04/22 (from the past 24 hour(s))   UA with Microscopic reflex to Culture    Specimen: Urine, Midstream   Result Value Ref Range    Color Urine Light Yellow Colorless, Straw, Light Yellow, Yellow    Appearance Urine Clear Clear    Glucose Urine Negative Negative mg/dL    Bilirubin Urine Negative Negative    Ketones Urine Trace (A) Negative mg/dL    Specific Gravity Urine 1.015 1.000 - 1.030    Blood Urine Small (A) Negative    pH Urine 5.5 5.0 - 9.0    Protein Albumin Urine Negative Negative mg/dL    Urobilinogen Urine Normal Normal, 2.0 mg/dL    Nitrite Urine " Negative Negative    Leukocyte Esterase Urine Moderate (A) Negative    Bacteria Urine Few (A) None Seen /HPF    Mucus Urine Present (A) None Seen /LPF    RBC Urine 5 (H) <=2 /HPF    WBC Urine 26 (H) <=5 /HPF    Squamous Epithelials Urine 8 (H) <=1 /HPF    Hyaline Casts Urine 1 <=2 /LPF    Narrative    Urine Culture ordered based on laboratory criteria   HCG qualitative urine (UPT)   Result Value Ref Range    hCG Urine Qualitative Negative Negative   CBC with platelets differential    Narrative    The following orders were created for panel order CBC with platelets differential.  Procedure                               Abnormality         Status                     ---------                               -----------         ------                     CBC with platelets and d...[[id number]]  Abnormal            Final result                 Please view results for these tests on the individual orders.   Comprehensive metabolic panel   Result Value Ref Range    Sodium 137 134 - 144 mmol/L    Potassium 4.2 3.5 - 5.1 mmol/L    Chloride 104 98 - 107 mmol/L    Carbon Dioxide (CO2) 25 21 - 31 mmol/L    Anion Gap 8 3 - 14 mmol/L    Urea Nitrogen 8 7 - 25 mg/dL    Creatinine 0.75 0.60 - 1.20 mg/dL    Calcium 9.5 8.6 - 10.3 mg/dL    Glucose 91 70 - 105 mg/dL    Alkaline Phosphatase 58 34 - 104 U/L    AST 9 (L) 13 - 39 U/L    ALT 6 (L) 7 - 52 U/L    Protein Total 7.3 6.4 - 8.9 g/dL    Albumin 4.0 3.5 - 5.7 g/dL    Bilirubin Total 0.4 0.3 - 1.0 mg/dL    GFR Estimate >90 >60 mL/min/1.73m2   Lipase   Result Value Ref Range    Lipase 6 (L) 11 - 82 U/L   Lactic acid whole blood   Result Value Ref Range    Lactic Acid 0.8 0.7 - 2.0 mmol/L   Ethanol GH   Result Value Ref Range    Alcohol ethyl <0.01 <=0.01 g/dL   CBC with platelets and differential   Result Value Ref Range    WBC Count 11.9 (H) 4.0 - 11.0 10e3/uL    RBC Count 4.34 3.80 - 5.20 10e6/uL    Hemoglobin 12.4 11.7 - 15.7 g/dL    Hematocrit 36.5 35.0 - 47.0 %    MCV 84 78 - 100  fL    MCH 28.6 26.5 - 33.0 pg    MCHC 34.0 31.5 - 36.5 g/dL    RDW 12.9 10.0 - 15.0 %    Platelet Count 271 150 - 450 10e3/uL    % Neutrophils 77 %    % Lymphocytes 15 %    % Monocytes 8 %    % Eosinophils 0 %    % Basophils 0 %    % Immature Granulocytes 0 %    NRBCs per 100 WBC 0 <1 /100    Absolute Neutrophils 9.2 (H) 1.6 - 8.3 10e3/uL    Absolute Lymphocytes 1.7 0.8 - 5.3 10e3/uL    Absolute Monocytes 0.9 0.0 - 1.3 10e3/uL    Absolute Eosinophils 0.0 0.0 - 0.7 10e3/uL    Absolute Basophils 0.0 0.0 - 0.2 10e3/uL    Absolute Immature Granulocytes 0.0 <=0.4 10e3/uL    Absolute NRBCs 0.0 10e3/uL   Extra Tube    Narrative    The following orders were created for panel order Extra Tube.  Procedure                               Abnormality         Status                     ---------                               -----------         ------                     Extra Blue Top Tube[[id number]]                              Final result                 Please view results for these tests on the individual orders.   Extra Blue Top Tube   Result Value Ref Range    Hold Specimen [facility]    Chlamydia trachomatis/Neisseria gonorrhoeae by PCR    Specimen: Urine, Voided   Result Value Ref Range    Chlamydia Trachomatis Negative Negative    Neisseria gonorrhoeae Negative Negative    Narrative    Assay performed using [employer] real-time, reverse-transcriptase PCR.   CT Abdomen Pelvis w Contrast    Addendum: 11/4/2022    There is a dictation error in the impression of the report. The  impression should state the following:    Subtle patchy hypoattenuation in the upper pole of the left kidney may  be due to pyelonephritis in the appropriate clinical setting.     No other acute findings in the abdomen or pelvis. No hydronephrosis or  obstructing urinary calculi. Normal appendix.    GARY AVILA MD         SYSTEM ID:  [id number]      Narrative    Exam: CT ABDOMEN PELVIS W CONTRAST    Exam reason: left flank pain- pyelonephritis vs  stone    Technique: Using helical CT technique, axial images of the abdomen and  pelvis were obtained with IV contrast.  Coronal and sagittal  reconstructions also performed. This CT was performed using one or  more of the following dose reduction techniques: automated exposure  control, adjustment of the mA and/or kV according to patient size,  and/or use of iterative reconstruction technique.    Meds/Contrast: Isovue 370, 90 mL    Comparison: 7/2/2020     FINDINGS:    ABDOMEN:    Liver: No mass or any significant abnormality.  Gallbladder: No calcified gallstones.   Bile Ducts: No biliary ductal dilation.   Spleen:  No splenomegaly or focal lesion.  Pancreas: No mass, ductal dilatation, or inflammatory changes.  Kidneys: There is a subtle patchy hypoattenuation in the upper pole of  the left kidney. No solid mass. No hydronephrosis. No calculi within  either kidney. No rim-enhancing fluid collection to suggest abscess.  Adrenals:  No nodules.   Lymph Nodes: No adenopathy.   Vascular: No aortic aneurysm.   Abdominal Wall: No acute findings.     PELVIS:   No mass or adenopathy.     Bowel/Mesentery/Peritoneum:   -No bowel obstruction.   -Normal appendix.  -No acute inflammatory findings.  -No ascites.    Visualized portions of the Chest: No pleural effusion or significant  findings.  Musculoskeletal: No acute osseous abnormalities.       Impression    IMPRESSION:  Subtle patchy hypoattenuation in the upper pole of the left kidney may  be due to a nephritis in the appropriate clinical setting.     No other acute findings in the abdomen or pelvis. No hydronephrosis or  obstructing urinary calculi. Normal appendix.    GARY AVILA MD         SYSTEM ID:  [id number]       Medications   HYDROmorphone (DILAUDID) injection 1 mg (1 mg Intravenous Given 11/4/22 6446)   ondansetron (ZOFRAN) injection 4 mg (4 mg Intravenous Given 11/4/22 0287)   ciprofloxacin (CIPRO) tablet 500 mg (has no administration in time range)  "  ketorolac (TORADOL) injection 30 mg (30 mg Intravenous Given 11/4/22 1448)   iopamidol (ISOVUE-370) solution 90 mL (90 mLs Intravenous Given 11/4/22 1736)       Assessments & Plan (with Medical Decision Making)  Amy Mcgowan is a 21 year old female here with bilateral flank pain which has become left sided and now radiates around to the front of her abdomen on the left side. She has quite a bit of pain on the left side of her back.  She has been drinking a lot lately for the past several weeks and now is trying to stop but is having withdrawal symptoms. She has been drinking shots a little each day to prevent alcohol withdrawal. She has had unprotected sex and is concerned about STD's. She has had pyelonephritis in the past.  VS in the ED BP (!) 145/95   Pulse 80   Temp 98.5  F (36.9  C) (Tympanic)   Resp 16   Ht 1.702 m (5' 7\")   Wt 78 kg (172 lb)   LMP  (LMP Unknown)   SpO2 100%   BMI 26.94 kg/m    Exam shows left CVA tenderness, no skin rash, no pain with light touch- I do not think this is shingles. We gave Dilaudid, Zofran, Toradol.  Labs show CBC with WBC 11,900, CMP normal, lipase normal, lactic acid normal, ethanol negative, UA is a dirty catch but implies UTI, gonorrhea and chlamydia negative, UPT negative. CT abdomen and pelvis shows possible pyelonephritis- patient has normal creatinine, WBC 11,900 and UA shows dirty catch with WBC and bacteria. We will treat with Cipro (first dose in ED) and Vicodin for pain, Rx for both to [facility].     I have reviewed the nursing notes.    I have reviewed the findings, diagnosis, plan and need for follow up with the patient.       New Prescriptions    CIPROFLOXACIN (CIPRO) 500 MG TABLET    Take 1 tablet (500 mg) by mouth 2 times daily for 7 days    OXYCODONE (ROXICODONE) 5 MG TABLET    Take 1 tablet (5 mg) by mouth every 6 hours as needed for severe pain DO NOT DRIVE FOR SIX HOURS AFTER YOU TAKE THIS MEDICINE.       Final diagnoses:   Pyelonephritis "       11/4/2022   [facility], Brandon Hudson MD  11/04/22 9899

## 2022-11-04 NOTE — ED TRIAGE NOTES
Pt here by herself with c/o left sided flank pain that radiates to left hip and left side x 3-4 days, VSS, pt reports that this feels like a kidney infection, urine sample collected, pt out into waiting room to wait for ED room   Triage Assessment     Row Name 11/04/22 1246       Triage Assessment (Adult)    Airway WDL WDL       Respiratory WDL    Respiratory WDL WDL       Skin Circulation/Temperature WDL    Skin Circulation/Temperature WDL WDL       Cardiac WDL    Cardiac WDL WDL       Peripheral/Neurovascular WDL    Peripheral Neurovascular WDL WDL       Cognitive/Neuro/Behavioral WDL    Cognitive/Neuro/Behavioral WDL WDL

## 2022-11-06 ENCOUNTER — TELEPHONE (OUTPATIENT)
Dept: EMERGENCY MEDICINE | Facility: OTHER | Age: 22
End: 2022-11-06

## 2022-11-06 ENCOUNTER — HOSPITAL ENCOUNTER (EMERGENCY)
Facility: OTHER | Age: 22
Discharge: HOME | End: 2022-11-06
Attending: INTERNAL MEDICINE | Admitting: INTERNAL MEDICINE
Payer: MEDICAID

## 2022-11-06 VITALS
SYSTOLIC BLOOD PRESSURE: 133 MMHG | HEIGHT: 67 IN | DIASTOLIC BLOOD PRESSURE: 67 MMHG | BODY MASS INDEX: 27 KG/M2 | RESPIRATION RATE: 16 BRPM | TEMPERATURE: 97.8 F | OXYGEN SATURATION: 99 % | WEIGHT: 172 LBS | HEART RATE: 60 BPM

## 2022-11-06 DIAGNOSIS — N12 PYELONEPHRITIS OF LEFT KIDNEY: Primary | ICD-10-CM

## 2022-11-06 DIAGNOSIS — N39.0 E-COLI UTI: ICD-10-CM

## 2022-11-06 DIAGNOSIS — B96.20 E-COLI UTI: ICD-10-CM

## 2022-11-06 LAB
ALBUMIN SERPL-MCNC: 3.7 G/DL (ref 3.5–5.7)
ALBUMIN UR-MCNC: 30 MG/DL
ALP SERPL-CCNC: 53 U/L (ref 34–104)
ALT SERPL W P-5'-P-CCNC: 6 U/L (ref 7–52)
ANION GAP SERPL CALCULATED.3IONS-SCNC: 4 MMOL/L (ref 3–14)
APPEARANCE UR: CLEAR
AST SERPL W P-5'-P-CCNC: 8 U/L (ref 13–39)
BACTERIA #/AREA URNS HPF: ABNORMAL /HPF
BACTERIA UR CULT: ABNORMAL
BASOPHILS # BLD AUTO: 0 10E3/UL (ref 0–0.2)
BASOPHILS NFR BLD AUTO: 0 %
BILIRUB SERPL-MCNC: 0.3 MG/DL (ref 0.3–1)
BILIRUB UR QL STRIP: NEGATIVE
BUN SERPL-MCNC: 12 MG/DL (ref 7–25)
CALCIUM SERPL-MCNC: 9.2 MG/DL (ref 8.6–10.3)
CHLORIDE BLD-SCNC: 102 MMOL/L (ref 98–107)
CO2 SERPL-SCNC: 27 MMOL/L (ref 21–31)
COLOR UR AUTO: YELLOW
CREAT SERPL-MCNC: 0.89 MG/DL (ref 0.6–1.2)
CRP SERPL-MCNC: 7.1 MG/L
EOSINOPHIL # BLD AUTO: 0 10E3/UL (ref 0–0.7)
EOSINOPHIL NFR BLD AUTO: 1 %
ERYTHROCYTE [DISTWIDTH] IN BLOOD BY AUTOMATED COUNT: 12.8 % (ref 10–15)
GFR SERPL CREATININE-BSD FRML MDRD: >90 ML/MIN/1.73M2
GLUCOSE BLD-MCNC: 97 MG/DL (ref 70–105)
GLUCOSE UR STRIP-MCNC: NEGATIVE MG/DL
HCG UR QL: NEGATIVE
HCT VFR BLD AUTO: 33.5 % (ref 35–47)
HGB BLD-MCNC: 11.5 G/DL (ref 11.7–15.7)
HGB UR QL STRIP: NEGATIVE
HOLD SPECIMEN: NORMAL
HYALINE CASTS: 1 /LPF
IMM GRANULOCYTES # BLD: 0 10E3/UL
IMM GRANULOCYTES NFR BLD: 0 %
KETONES UR STRIP-MCNC: ABNORMAL MG/DL
LACTATE SERPL-SCNC: 0.7 MMOL/L (ref 0.7–2)
LEUKOCYTE ESTERASE UR QL STRIP: ABNORMAL
LYMPHOCYTES # BLD AUTO: 2.6 10E3/UL (ref 0.8–5.3)
LYMPHOCYTES NFR BLD AUTO: 35 %
MCH RBC QN AUTO: 29.2 PG (ref 26.5–33)
MCHC RBC AUTO-ENTMCNC: 34.3 G/DL (ref 31.5–36.5)
MCV RBC AUTO: 85 FL (ref 78–100)
MONOCYTES # BLD AUTO: 0.8 10E3/UL (ref 0–1.3)
MONOCYTES NFR BLD AUTO: 10 %
MUCOUS THREADS #/AREA URNS LPF: PRESENT /LPF
NEUTROPHILS # BLD AUTO: 4 10E3/UL (ref 1.6–8.3)
NEUTROPHILS NFR BLD AUTO: 54 %
NITRATE UR QL: NEGATIVE
NRBC # BLD AUTO: 0 10E3/UL
NRBC BLD AUTO-RTO: 0 /100
PH UR STRIP: 6 [PH] (ref 5–9)
PLATELET # BLD AUTO: 254 10E3/UL (ref 150–450)
POTASSIUM BLD-SCNC: 4.1 MMOL/L (ref 3.5–5.1)
PROT SERPL-MCNC: 6.5 G/DL (ref 6.4–8.9)
RBC # BLD AUTO: 3.94 10E6/UL (ref 3.8–5.2)
RBC URINE: 1 /HPF
SODIUM SERPL-SCNC: 133 MMOL/L (ref 134–144)
SP GR UR STRIP: 1.03 (ref 1–1.03)
SQUAMOUS EPITHELIAL: 5 /HPF
UROBILINOGEN UR STRIP-MCNC: 2 MG/DL
WBC # BLD AUTO: 7.5 10E3/UL (ref 4–11)
WBC URINE: 6 /HPF

## 2022-11-06 PROCEDURE — 250N000011 HC RX IP 250 OP 636: Performed by: INTERNAL MEDICINE

## 2022-11-06 PROCEDURE — 250N000011 HC RX IP 250 OP 636: Performed by: EMERGENCY MEDICINE

## 2022-11-06 PROCEDURE — 96365 THER/PROPH/DIAG IV INF INIT: CPT | Performed by: EMERGENCY MEDICINE

## 2022-11-06 PROCEDURE — 99284 EMERGENCY DEPT VISIT MOD MDM: CPT | Performed by: EMERGENCY MEDICINE

## 2022-11-06 PROCEDURE — 85025 COMPLETE CBC W/AUTO DIFF WBC: CPT | Performed by: INTERNAL MEDICINE

## 2022-11-06 PROCEDURE — 258N000003 HC RX IP 258 OP 636: Performed by: INTERNAL MEDICINE

## 2022-11-06 PROCEDURE — 81025 URINE PREGNANCY TEST: CPT | Performed by: EMERGENCY MEDICINE

## 2022-11-06 PROCEDURE — 250N000013 HC RX MED GY IP 250 OP 250 PS 637: Performed by: EMERGENCY MEDICINE

## 2022-11-06 PROCEDURE — 96367 TX/PROPH/DG ADDL SEQ IV INF: CPT | Performed by: EMERGENCY MEDICINE

## 2022-11-06 PROCEDURE — 36415 COLL VENOUS BLD VENIPUNCTURE: CPT | Performed by: INTERNAL MEDICINE

## 2022-11-06 PROCEDURE — 86140 C-REACTIVE PROTEIN: CPT | Performed by: INTERNAL MEDICINE

## 2022-11-06 PROCEDURE — 96375 TX/PRO/DX INJ NEW DRUG ADDON: CPT | Performed by: EMERGENCY MEDICINE

## 2022-11-06 PROCEDURE — 99285 EMERGENCY DEPT VISIT HI MDM: CPT | Mod: 25 | Performed by: EMERGENCY MEDICINE

## 2022-11-06 PROCEDURE — 80053 COMPREHEN METABOLIC PANEL: CPT | Performed by: INTERNAL MEDICINE

## 2022-11-06 PROCEDURE — 83605 ASSAY OF LACTIC ACID: CPT | Performed by: INTERNAL MEDICINE

## 2022-11-06 PROCEDURE — 81001 URINALYSIS AUTO W/SCOPE: CPT | Performed by: EMERGENCY MEDICINE

## 2022-11-06 RX ORDER — CIPROFLOXACIN 2 MG/ML
400 INJECTION, SOLUTION INTRAVENOUS ONCE
Status: COMPLETED | OUTPATIENT
Start: 2022-11-06 | End: 2022-11-06

## 2022-11-06 RX ORDER — ONDANSETRON 4 MG/1
4 TABLET, ORALLY DISINTEGRATING ORAL ONCE
Status: COMPLETED | OUTPATIENT
Start: 2022-11-06 | End: 2022-11-06

## 2022-11-06 RX ORDER — MORPHINE SULFATE 4 MG/ML
4 INJECTION, SOLUTION INTRAMUSCULAR; INTRAVENOUS
Status: COMPLETED | OUTPATIENT
Start: 2022-11-06 | End: 2022-11-06

## 2022-11-06 RX ORDER — CEFTRIAXONE SODIUM 1 G/50ML
1 INJECTION, SOLUTION INTRAVENOUS ONCE
Status: COMPLETED | OUTPATIENT
Start: 2022-11-06 | End: 2022-11-06

## 2022-11-06 RX ORDER — ONDANSETRON 2 MG/ML
4 INJECTION INTRAMUSCULAR; INTRAVENOUS ONCE
Status: COMPLETED | OUTPATIENT
Start: 2022-11-06 | End: 2022-11-06

## 2022-11-06 RX ADMIN — CEFTRIAXONE SODIUM 1 G: 1 INJECTION, SOLUTION INTRAVENOUS at 18:03

## 2022-11-06 RX ADMIN — IBUPROFEN 600 MG: 200 TABLET, FILM COATED ORAL at 13:30

## 2022-11-06 RX ADMIN — ONDANSETRON 4 MG: 2 INJECTION INTRAMUSCULAR; INTRAVENOUS at 19:31

## 2022-11-06 RX ADMIN — MORPHINE SULFATE 4 MG: 4 INJECTION, SOLUTION INTRAMUSCULAR; INTRAVENOUS at 18:36

## 2022-11-06 RX ADMIN — ONDANSETRON 4 MG: 4 TABLET, ORALLY DISINTEGRATING ORAL at 13:31

## 2022-11-06 RX ADMIN — SODIUM CHLORIDE 1000 ML: 9 INJECTION, SOLUTION INTRAVENOUS at 18:03

## 2022-11-06 RX ADMIN — CIPROFLOXACIN 400 MG: 2 INJECTION, SOLUTION INTRAVENOUS at 18:35

## 2022-11-06 ASSESSMENT — ACTIVITIES OF DAILY LIVING (ADL): ADLS_ACUITY_SCORE: 35

## 2022-11-06 NOTE — TELEPHONE ENCOUNTER
"[facility] Emergency Department Lab result notification    [facility] lab result protocol used  Urine culture    Reason for call  Notify of lab results, assess symptoms,  review ED providers recommendations/discharge instructions (if necessary) and advise per ED lab result f/u protocol    Lab Result (including Rx patient on, if applicable)  Final Urine Culture Report on 11/6/22  [facility] Emergency Dept discharge antibiotic prescribed: Ciprofloxacin (Cipro) 500 mg tablet, 1 tablet (500 mg) by mouth 2 times daily for 7 days.  #1. Bacteria, >100,000 CFU/ML Escherichia coli,  is [INTERMEDIATE SUSCEPTIBLE] to antibiotic.   Recommendations in treatment per [facility] ED lab result Urine Culture protocol.      Information table from Emergency Dept Provider visit on 11/4/22  Symptoms reported at ED visit (Chief complaint, HPI) Chief Complaint   Patient presents with     Flank Pain      The history is provided by the patient.      Amy Mcgowan is a 21 year old female here with bilateral flank pain which has become left sided and now radiates around to the front of her abdomen on the left side. She has quite a bit of pain on the left side of her back.  She has been drinking a lot lately for the past several weeks and now is trying to stop but is having withdrawal symptoms. She has been drinking shots a little each day to prevent alcohol withdrawal. She has had unprotected sex and is concerned about STD's.     Significant Medical hx, if applicable (i.e. CKD, diabetes) Reviewed   Allergies Allergies   Allergen Reactions     Acetaminophen Hives, Other (See Comments) and Unknown     States \"I have an allergic reaction\" hives  States \"I have an allergic reaction\" hives    States \"I have an allergic reaction\" hives  States \"I have an allergic reaction\" hives  States \"I have an allergic reaction\" hives  States \"I have an allergic reaction\" hives     Aspirin Other (See Comments) and " "Unknown     Her face became numb and her hands , also light headed . And became anxiouse  Fainting and hyperventalation    Her face became numb and her hands , also light headed . And became anxiouse  Her face became numb and her hands , also light headed . And became anxiouse  Fainting and hyperventalation  Fainting and hyperventalation      Weight, if applicable Wt Readings from Last 2 Encounters:   11/04/22 78 kg (172 lb)   09/19/22 78.3 kg (172 lb 9.6 oz)      Coumadin/Warfarin [Yes /No] NO   Creatinine Level (mg/dl) Creatinine   Date Value Ref Range Status   11/04/2022 0.75 0.60 - 1.20 mg/dL Final   07/02/2020 0.79 0.60 - 1.20 mg/dL Final      Creatinine clearance (ml/min), if applicable Serum creatinine: 0.75 mg/dL 11/04/22 1400  Estimated creatinine clearance: 126.7 mL/min   Pregnant (Yes/No/NA) Negative HCG 11/4/22 and on Nexplanon   Breastfeeding (Yes/No/NA) Unknown   ED providers Impression and Plan (applicable information) Amy Mcgowan is a 21 year old female here with bilateral flank pain which has become left sided and now radiates around to the front of her abdomen on the left side. She has quite a bit of pain on the left side of her back.  She has been drinking a lot lately for the past several weeks and now is trying to stop but is having withdrawal symptoms. She has been drinking shots a little each day to prevent alcohol withdrawal. She has had unprotected sex and is concerned about STD's. She has had pyelonephritis in the past.  VS in the ED BP (!) 145/95   Pulse 80   Temp 98.5  F (36.9  C) (Tympanic)   Resp 16   Ht 1.702 m (5' 7\")   Wt 78 kg (172 lb)   LMP  (LMP Unknown)   SpO2 100%   BMI 26.94 kg/m    Exam shows left CVA tenderness, no skin rash, no pain with light touch- I do not think this is shingles. We gave Dilaudid, Zofran, Toradol.  Labs show CBC with WBC 11,900, CMP normal, lipase normal, lactic acid normal, ethanol negative, UA is a dirty catch but implies UTI, gonorrhea " "and chlamydia negative, UPT negative. CT abdomen and pelvis shows possible pyelonephritis- patient has normal creatinine, WBC 11,900 and UA shows dirty catch with WBC and bacteria. We will treat with Cipro (first dose in ED) and Vicodin for pain, Rx for both to [facility].      I have reviewed the nursing notes.     I have reviewed the findings, diagnosis, plan and need for follow up with the patient.             New Prescriptions     CIPROFLOXACIN (CIPRO) 500 MG TABLET    Take 1 tablet (500 mg) by mouth 2 times daily for 7 days     OXYCODONE (ROXICODONE) 5 MG TABLET    Take 1 tablet (5 mg) by mouth every 6 hours as needed for severe pain DO NOT DRIVE FOR SIX HOURS AFTER YOU TAKE THIS MEDICINE.         Final diagnoses:   Pyelonephritis         11/4/2022   [facility]     Brandon Pelayo MD  11/04/22 1819     ED diagnosis  Pyelonephritis     ED provider  Brandon Pelayo MD        RN Assessment (Patient s current Symptoms), include time called.  11:13 AM - patient reports she hasn't picked up medication from [facility] because 'they are not open on the weekends\" when asked if she feels worse she says \"well I haven't gotten out of bed\"  Breastfeeding:  No  Genitourinary symptoms:  No pain/burning, frequency, urgency  Fever:  \"um I could possibly I don't know I don't think so\"  Flank pain:  Bilateral flank pain - pain is continuous - 9/10 - radiating around to the abdomen  Nausea/vomiting:  No    RN Recommendations/Instructions per [facility] ED lab result protocol  Patient notified of lab result and treatment recommendations. Patient was advised to please return to emergency department for reports of worsening symptoms and inability to  medication - please return today  11/6/2022 patient verbalized understanding and agrees with plan.    Please Contact your PCP clinic or return to the Emergency department if your:    Symptoms return.    Symptoms do not improve after 3 days on " antibiotic.    Symptoms do not resolve after completing antibiotic.    Symptoms worsen or other concerning symptom's.    PCP follow-up Questions asked: NO    Susan Toro RN  [facility] "[employer] [place]  Emergency Dept Lab Result RN  # [phone]     Copy of Lab result   Contains abnormal data Urine Culture  Order: [id number] - Reflex for Order [id number]   Status: Final result      Visible to patient: No (inaccessible in MyChart)     Specimen Information: Urine, Midstream    2 Result Notes  Culture >100,000 CFU/mL Escherichia coli Abnormal             Resulting Agency: [facility]     Susceptibility    Escherichia coli (1)    Antibiotic Interpretation Sensitivity Method Status    Amoxicillin/Clavulanate Susceptible <=8 JULES Final    Ampicillin Resistant >16 JULES Final    Ampicillin/ Sulbactam Resistant >16 JULES Final    Aztreonam Susceptible <=4 JULES Final    Cefazolin Susceptible <=2 JULES Final    Cefepime Susceptible <=2 JULES Final    Ceftazidime Susceptible <=1 JULES Final    Ceftriaxone Susceptible <=1 JULES Final    Cefoxitin Susceptible <=8 JULES Final    Ciprofloxacin   JULES Final     Ciprofloxacin not resistant.  Due to test limitations, lab cannot provide JULES to determine susceptibility.       Ertapenem Susceptible <=0.5 JULES Final    Gentamicin Susceptible <=4 JULES Final    Imipenem Susceptible <=1 JULES Final    Levofloxacin   JULES Final     Levofloxacin not resistant.  Due to test limitations, lab cannot provide JULES to determine susceptibility.       Nitrofurantoin Susceptible <=32 JULES Final    Piperacillin/Tazobactam Susceptible <=16 JULES Final    Tetracycline Susceptible <=4 JULES Final    Tobramycin Susceptible <=4 JULES Final    Trimethoprim/Sulfamethoxazole Susceptible <=2/38 JULES Final    Cefpodoxime Susceptible <=2 JULES Final    Cefuroxime Susceptible <=4 JULES Final    Trimethoprim Susceptible <=8 JULES Final          Specimen Collected: 11/04/22 12:49 PM Last Resulted: 11/06/22  9:32 AM

## 2022-11-06 NOTE — ED TRIAGE NOTES
Pt arrives with c/o being diagnosed with a kidney infection two days ago, was unable to  her medications/antibiotics, pain and discomfort are significantly worse now. Pt last took 200mg of ibuprofen at noon today. Pt given ibuprofen and zofran in triage.     Triage Assessment       Row Name 11/06/22 1327       Triage Assessment (Adult)    Airway WDL WDL       Respiratory WDL    Respiratory WDL WDL       Skin Circulation/Temperature WDL    Skin Circulation/Temperature WDL WDL       Cardiac WDL    Cardiac WDL WDL       Peripheral/Neurovascular WDL    Peripheral Neurovascular WDL WDL       Cognitive/Neuro/Behavioral WDL    Cognitive/Neuro/Behavioral WDL WDL

## 2022-11-07 NOTE — DISCHARGE INSTRUCTIONS
Take ciprofloxacin as prescribed by emergency room on 11/4.    Push oral hydration.  Prevent dehydration.    Follow-up with primary care provider as needed for new or worsening symptoms.

## 2022-11-07 NOTE — ED PROVIDER NOTES
Emergency Department Provider Note  : 2000 Age: 21 year old Sex: female MRN: [id number]    Chief Complaint   Patient presents with     Kidney Infection       Medical Decision Making / Assessment / Plan   21 year old female presenting with pyelonephritis, E. coli UTI    ED Course as of 225   Sun 2022   1753 Patient evaluated.  2 days ago diagnosed with left-sided pyelonephritis.  Unfortunately her pharmacy is closed over the weekend and was not able to  her prescriptions.  Subsequently her symptoms have all worsened because she has not had any antibiotics for the last 2 days.  Having bilateral flank pain now.  Initially just left-sided flank pain.  Dysuria noted.  Given worsening symptoms, bilateral kidney involvement.  Check lab work, IV fluid IV fluid bolus.  Urine culture shows E. coli.  Sensitive to ceftriaxone, ciprofloxacin.  Morphine 4 mg IV ordered.    Patient feeling better after above interventions.  She would like to discharge home.  She has oral antibiotics prescription at the local pharmacy.  Patient discharged home in stable, improved condition.        The patient was informed of the plan and verbalized understanding and agreed with the plan. The patient was given strict return to Emergency Department precautions as well as appropriate follow up instructions. The patient was discharged in stable condition.    New Prescriptions    No medications on file       Final diagnoses:   Pyelonephritis of left kidney   E-coli UTI       Matt Gomez MD  2022   Emergency Department    Estefania Reyes is a 21 year old female who presents at  5:36 PM with pyelonephritis diagnosed on .  Unfortunately pharmacy was closed and not able to  her medications over the weekend.  Symptoms worsened and presented to the emergency room for evaluation and treatment.  She has a prescription for ciprofloxacin pending at pharmacy which is closed at this time.    Culture has  "returned showing E. coli.  Had pyelonephritis on CT scan from 7/4.    Sensitivity noted.  1 g IV Rocephin ordered.  IV ciprofloxacin ordered.    Patient is having a lot of bilateral flank pain now not as left-sided.  No other new symptoms.    I have reviewed the Medications, Allergies, Past Medical and Surgical History, and Social History in the Epic System and with family.    Review of Systems:  Please see Subjective / HPI for pertinent positives and negatives. All other systems reviewed and found to be negative.      Objective     Patient Vitals for the past 24 hrs:   BP Temp Pulse Resp SpO2 Height Weight   11/06/22 1737 -- -- -- -- -- 1.702 m (5' 7\") 78 kg (172 lb)   11/06/22 1327 129/65 97.8  F (36.6  C) 67 16 99 % -- --       Physical Exam:   General: Awake, alert, uncomfortable due to pain.  Head: Normocephalic, atraumatic.  Eyes: Conjugate gaze.  ENT: Moist membranes, external ear appears normal.   Chest/Respiratory: Equal chest rise, clear bilaterally.  Cardiovascular: Peripheral pulses present, regular rate and rhythm.  Abdominal: Soft, non-distended, bilateral CVA tenderness, suprapubic tenderness  Extremities: No obvious deformity.  Neurological: GCS 15, moving all extremities without gross deficit.  Skin: Warm, no rashes, lesions, or bruising.   Psychiatric: Appropriate affect.     Procedures / Critical Care   Procedures    Critical Care Time: None.     Orders Placed This Encounter   Procedures     HCG qualitative urine     UA with Microscopic reflex to Culture     Comprehensive metabolic panel     Lactic acid whole blood     CRP inflammation     CBC with platelets and differential     Extra Tube     Extra Serum Separator Tube (SST)     Peripheral IV catheter     CBC with platelets differential       RESULTS:  Results for orders placed or performed during the hospital encounter of 11/06/22   HCG qualitative urine     Status: Normal   Result Value Ref Range    hCG Urine Qualitative Negative Negative   UA " with Microscopic reflex to Culture     Status: Abnormal    Specimen: Urine, Midstream   Result Value Ref Range    Color Urine Yellow Colorless, Straw, Light Yellow, Yellow    Appearance Urine Clear Clear    Glucose Urine Negative Negative mg/dL    Bilirubin Urine Negative Negative    Ketones Urine Trace (A) Negative mg/dL    Specific Gravity Urine 1.027 1.000 - 1.030    Blood Urine Negative Negative    pH Urine 6.0 5.0 - 9.0    Protein Albumin Urine 30 (A) Negative mg/dL    Urobilinogen Urine 2.0 Normal, 2.0 mg/dL    Nitrite Urine Negative Negative    Leukocyte Esterase Urine Small (A) Negative    Bacteria Urine Few (A) None Seen /HPF    Mucus Urine Present (A) None Seen /LPF    RBC Urine 1 <=2 /HPF    WBC Urine 6 (H) <=5 /HPF    Squamous Epithelials Urine 5 (H) <=1 /HPF    Hyaline Casts Urine 1 <=2 /LPF    Narrative    Urine Culture not indicated   Comprehensive metabolic panel     Status: Abnormal   Result Value Ref Range    Sodium 133 (L) 134 - 144 mmol/L    Potassium 4.1 3.5 - 5.1 mmol/L    Chloride 102 98 - 107 mmol/L    Carbon Dioxide (CO2) 27 21 - 31 mmol/L    Anion Gap 4 3 - 14 mmol/L    Urea Nitrogen 12 7 - 25 mg/dL    Creatinine 0.89 0.60 - 1.20 mg/dL    Calcium 9.2 8.6 - 10.3 mg/dL    Glucose 97 70 - 105 mg/dL    Alkaline Phosphatase 53 34 - 104 U/L    AST 8 (L) 13 - 39 U/L    ALT 6 (L) 7 - 52 U/L    Protein Total 6.5 6.4 - 8.9 g/dL    Albumin 3.7 3.5 - 5.7 g/dL    Bilirubin Total 0.3 0.3 - 1.0 mg/dL    GFR Estimate >90 >60 mL/min/1.73m2   Lactic acid whole blood     Status: Normal   Result Value Ref Range    Lactic Acid 0.7 0.7 - 2.0 mmol/L   CRP inflammation     Status: Normal   Result Value Ref Range    CRP Inflammation 7.1 <10.0 mg/L   CBC with platelets and differential     Status: Abnormal   Result Value Ref Range    WBC Count 7.5 4.0 - 11.0 10e3/uL    RBC Count 3.94 3.80 - 5.20 10e6/uL    Hemoglobin 11.5 (L) 11.7 - 15.7 g/dL    Hematocrit 33.5 (L) 35.0 - 47.0 %    MCV 85 78 - 100 fL    MCH 29.2  26.5 - 33.0 pg    MCHC 34.3 31.5 - 36.5 g/dL    RDW 12.8 10.0 - 15.0 %    Platelet Count 254 150 - 450 10e3/uL    % Neutrophils 54 %    % Lymphocytes 35 %    % Monocytes 10 %    % Eosinophils 1 %    % Basophils 0 %    % Immature Granulocytes 0 %    NRBCs per 100 WBC 0 <1 /100    Absolute Neutrophils 4.0 1.6 - 8.3 10e3/uL    Absolute Lymphocytes 2.6 0.8 - 5.3 10e3/uL    Absolute Monocytes 0.8 0.0 - 1.3 10e3/uL    Absolute Eosinophils 0.0 0.0 - 0.7 10e3/uL    Absolute Basophils 0.0 0.0 - 0.2 10e3/uL    Absolute Immature Granulocytes 0.0 <=0.4 10e3/uL    Absolute NRBCs 0.0 10e3/uL   Extra Tube     Status: None    Narrative    The following orders were created for panel order Extra Tube.  Procedure                               Abnormality         Status                     ---------                               -----------         ------                     Extra Serum Separator Tu...[[id number]]                      Final result                 Please view results for these tests on the individual orders.   Extra Serum Separator Tube (SST)     Status: None   Result Value Ref Range    Hold Specimen Hold    CBC with platelets differential     Status: Abnormal    Narrative    The following orders were created for panel order CBC with platelets differential.  Procedure                               Abnormality         Status                     ---------                               -----------         ------                     CBC with platelets and d...[[id number]]  Abnormal            Final result                 Please view results for these tests on the individual orders.      Take ciprofloxacin as prescribed by previous provider.    Follow-up with your primary care provider as needed for new or worsening symptoms.       Medical/Surgical History:  History reviewed. No pertinent past medical history.  History reviewed. No pertinent surgical history.    Medications:  No current facility-administered medications for  this encounter.     Current Outpatient Medications   Medication     ciprofloxacin (CIPRO) 500 MG tablet     etonogestrel (NEXPLANON) 68 MG IMPL     hydrOXYzine (VISTARIL) 25 MG capsule     ibuprofen (ADVIL/MOTRIN) 800 MG tablet     lamoTRIgine (LAMICTAL) 25 MG tablet     levothyroxine (SYNTHROID/LEVOTHROID) 88 MCG tablet     metroNIDAZOLE (FLAGYL) 500 MG tablet     nitroFURantoin macrocrystal-monohydrate (MACROBID) 100 MG capsule     oxyCODONE (ROXICODONE) 5 MG tablet     oxyCODONE (ROXICODONE) 5 MG tablet       Allergies:  Acetaminophen and Aspirin    Relevant labs, images, EKGs, Epic and outside hospital (if applicable) charts were reviewed. The findings, diagnosis, plan, and need for follow up were discussed with the patient/family. Nursing notes were reviewed.      Matt Gomez MD  11/06/22 4261

## 2022-11-12 ENCOUNTER — OFFICE VISIT (OUTPATIENT)
Dept: FAMILY MEDICINE | Facility: OTHER | Age: 22
End: 2022-11-12
Attending: NURSE PRACTITIONER
Payer: MEDICAID

## 2022-11-12 VITALS
DIASTOLIC BLOOD PRESSURE: 88 MMHG | OXYGEN SATURATION: 98 % | WEIGHT: 177 LBS | SYSTOLIC BLOOD PRESSURE: 130 MMHG | RESPIRATION RATE: 18 BRPM | BODY MASS INDEX: 27.72 KG/M2 | TEMPERATURE: 98.3 F | HEART RATE: 70 BPM

## 2022-11-12 DIAGNOSIS — E06.3 CHRONIC LYMPHOCYTIC THYROIDITIS: Primary | ICD-10-CM

## 2022-11-12 LAB
HOLD SPECIMEN: NORMAL
TSH SERPL DL<=0.005 MIU/L-ACNC: 0.78 UIU/ML (ref 0.3–4.2)

## 2022-11-12 PROCEDURE — 84443 ASSAY THYROID STIM HORMONE: CPT | Mod: ZL | Performed by: NURSE PRACTITIONER

## 2022-11-12 PROCEDURE — G0463 HOSPITAL OUTPT CLINIC VISIT: HCPCS

## 2022-11-12 PROCEDURE — 99214 OFFICE O/P EST MOD 30 MIN: CPT | Performed by: NURSE PRACTITIONER

## 2022-11-12 PROCEDURE — 36415 COLL VENOUS BLD VENIPUNCTURE: CPT | Mod: ZL | Performed by: NURSE PRACTITIONER

## 2022-11-12 RX ORDER — LEVOTHYROXINE SODIUM 88 UG/1
88 TABLET ORAL DAILY
Qty: 90 TABLET | Refills: 0 | Status: SHIPPED | OUTPATIENT
Start: 2022-11-12 | End: 2023-02-10

## 2022-11-12 ASSESSMENT — PAIN SCALES - GENERAL: PAINLEVEL: NO PAIN (0)

## 2022-11-12 NOTE — LETTER
November 12, 2022      Amy Mcgowan  [address] [address]        Dear ,    We are writing to inform you of your test results.    Resulted Orders   TSH with free T4 reflex   Result Value Ref Range    TSH 0.78 0.30 - 4.20 uIU/mL       If you have any questions or concerns, please call the clinic at the number listed above.       Sincerely,      Kylah Bee NP

## 2022-11-12 NOTE — PROGRESS NOTES
HPI:    Amy Mcgowan is a 21 year old female who presents to [facility] today for refill of her thyroid medication. She is out and is reported to have upcoming appointment with her PCP in [place]. She was instructed that we can get her thyroid labs today prior to a refill. Reports she took her last pill today.    ROS:  Refer to HPI    /88 (BP Location: Right arm, Patient Position: Sitting, Cuff Size: Adult Regular)   Pulse 70   Temp 98.3  F (36.8  C) (Tympanic)   Resp 18   Wt 80.3 kg (177 lb)   LMP  (LMP Unknown)   SpO2 98%   BMI 27.72 kg/m      EXAM:  General Appearance: Well appearing female, appropriate appearance for age. No acute distress. Very strong odor of marijuana.  Respiratory: normal chest wall and respirations.  Normal effort.  Clear to auscultation bilaterally, no wheezing, crackles or rhonchi.  No increased work of breathing.  No cough appreciated.  Cardiac: RRR with no murmurs  Musculoskeletal:  Equal movement of bilateral upper extremities.  Equal movement of bilateral lower extremities.  Normal gait.    Dermatological: no rashes noted of exposed skin  Psychological: normal affect, alert, oriented, and pleasant.     Diagnostics:  Results for orders placed or performed in visit on 11/12/22   TSH with free T4 reflex     Status: Normal   Result Value Ref Range    TSH 0.78 0.30 - 4.20 uIU/mL   Extra Tube     Status: None    Narrative    The following orders were created for panel order Extra Tube.  Procedure                               Abnormality         Status                     ---------                               -----------         ------                     Extra Purple Top Tube[[id number]]                            Final result                 Please view results for these tests on the individual orders.   Extra Purple Top Tube     Status: None   Result Value Ref Range    Hold Specimen JIC        ASSESSMENT/PLAN:  Amy was seen today for medication  request.    Diagnoses and all orders for this visit:    Chronic lymphocytic thyroiditis  -     TSH with free T4 reflex, in normal range. Strict instructions given to follow up in 3 months with PCP.  -     levothyroxine (SYNTHROID/LEVOTHROID) 88 MCG tablet; Take 1 tablet (88 mcg) by mouth daily for 90 days    Follow up with PCP in 3 months.    I explained my diagnostic considerations and recommendations to the patient, who voiced understanding and agreement with the treatment plan. All questions were answered. We discussed potential side effects of any prescribed or recommended therapies, as well as expectations for response to treatments.    QUINN Finley, AGNP-C  [facility]  11/12/2022 11:50 AM    Total time spent with this patient was 30 minutes which included chart review, visualization and interpretation of labs/images, time spent with the patient and documentation.    No past medical history on file.  No past surgical history on file.  Social History     Tobacco Use     Smoking status: Every Day     Packs/day: 0.50     Types: Cigarettes     Smokeless tobacco: Never     Tobacco comments:     pack/week   Substance Use Topics     Alcohol use: Yes     Alcohol/week: 10.0 standard drinks     Types: 10 Shots of liquor per week     Current Outpatient Medications   Medication Sig Dispense Refill     etonogestrel (NEXPLANON) 68 MG IMPL Inject 1 each Subcutaneous       ibuprofen (ADVIL/MOTRIN) 800 MG tablet Take 1 tablet (800 mg) by mouth every 8 hours as needed for moderate pain 30 tablet 0     medical cannabis (Patient's own supply) Mar Message Only. Treatment with medical cannabis began on end of 2021.  Dosage form used is  liquid oil for other medical condition or treatment approved by the [place] commissioner of health.       hydrOXYzine (VISTARIL) 25 MG capsule TAKE 1 CAPSULE BY MOUTH THREE TIMES DAILY AS NEEDED (Patient not taking: Reported on 9/19/2022)       lamoTRIgine (LAMICTAL) 25 MG tablet TAKE  "1 TABLET BY MOUTH EVERY DAY AS DIRECTED (Patient not taking: Reported on 9/19/2022)       levothyroxine (SYNTHROID/LEVOTHROID) 88 MCG tablet Take 88 mcg by mouth daily (Patient not taking: Reported on 11/12/2022)       metroNIDAZOLE (FLAGYL) 500 MG tablet  (Patient not taking: Reported on 9/19/2022)       nitroFURantoin macrocrystal-monohydrate (MACROBID) 100 MG capsule TAKE 1 CAPSULE BY MOUTH TWICE DAILY WITH MEALS AND WITH FOOD OR MILK FOR 5 DAYS FOR INFECTION (Patient not taking: Reported on 9/19/2022)       oxyCODONE (ROXICODONE) 5 MG tablet Take 1 tablet (5 mg) by mouth every 6 hours as needed for breakthrough pain (Patient not taking: Reported on 9/30/2021) 6 tablet 0     Allergies   Allergen Reactions     Acetaminophen Hives, Other (See Comments) and Unknown     States \"I have an allergic reaction\" hives  States \"I have an allergic reaction\" hives    States \"I have an allergic reaction\" hives  States \"I have an allergic reaction\" hives  States \"I have an allergic reaction\" hives  States \"I have an allergic reaction\" hives     Aspirin Other (See Comments) and Unknown     Her face became numb and her hands , also light headed . And became anxiouse  Fainting and hyperventalation    Her face became numb and her hands , also light headed . And became anxiouse  Her face became numb and her hands , also light headed . And became anxiouse  Fainting and hyperventalation  Fainting and hyperventalation       Past medical history, past surgical history, current medications and allergies reviewed and accurate to the best of my knowledge.      Nursing Notes:   Madison Rojas LPN  11/12/2022 10:22 AM  Signed  Chief Complaint   Patient presents with     Medication Request     Wants refill on levothyroxine       Initial /88 (BP Location: Right arm, Patient Position: Sitting, Cuff Size: Adult Regular)   Pulse 70   Temp 98.3  F (36.8  C) (Tympanic)   Resp 18   Wt 80.3 kg (177 lb)   LMP  (LMP Unknown)   SpO2 98%   BMI " "27.72 kg/m   Estimated body mass index is 27.72 kg/m  as calculated from the following:    Height as of 11/6/22: 1.702 m (5' 7\").    Weight as of this encounter: 80.3 kg (177 lb).  Medication Reconciliation: complete    Madison Rojas LPN      "

## 2022-11-12 NOTE — NURSING NOTE
"Chief Complaint   Patient presents with     Medication Request     Wants refill on levothyroxine       Initial /88 (BP Location: Right arm, Patient Position: Sitting, Cuff Size: Adult Regular)   Pulse 70   Temp 98.3  F (36.8  C) (Tympanic)   Resp 18   Wt 80.3 kg (177 lb)   LMP  (LMP Unknown)   SpO2 98%   BMI 27.72 kg/m   Estimated body mass index is 27.72 kg/m  as calculated from the following:    Height as of 11/6/22: 1.702 m (5' 7\").    Weight as of this encounter: 80.3 kg (177 lb).  Medication Reconciliation: complete    Madison Rojas LPN    "

## 2024-07-17 ENCOUNTER — APPOINTMENT (OUTPATIENT)
Dept: GENERAL RADIOLOGY | Facility: OTHER | Age: 24
End: 2024-07-17
Attending: STUDENT IN AN ORGANIZED HEALTH CARE EDUCATION/TRAINING PROGRAM
Payer: COMMERCIAL

## 2024-07-17 ENCOUNTER — HOSPITAL ENCOUNTER (EMERGENCY)
Facility: OTHER | Age: 24
Discharge: HOME | End: 2024-07-18
Attending: STUDENT IN AN ORGANIZED HEALTH CARE EDUCATION/TRAINING PROGRAM | Admitting: STUDENT IN AN ORGANIZED HEALTH CARE EDUCATION/TRAINING PROGRAM
Payer: COMMERCIAL

## 2024-07-17 VITALS
DIASTOLIC BLOOD PRESSURE: 112 MMHG | HEART RATE: 120 BPM | RESPIRATION RATE: 18 BRPM | TEMPERATURE: 100 F | SYSTOLIC BLOOD PRESSURE: 147 MMHG | BODY MASS INDEX: 26.68 KG/M2 | OXYGEN SATURATION: 99 % | WEIGHT: 170 LBS | HEIGHT: 67 IN

## 2024-07-17 DIAGNOSIS — L03.011 ACUTE PARONYCHIA OF THUMB, RIGHT: ICD-10-CM

## 2024-07-17 PROCEDURE — 250N000011 HC RX IP 250 OP 636: Performed by: STUDENT IN AN ORGANIZED HEALTH CARE EDUCATION/TRAINING PROGRAM

## 2024-07-17 PROCEDURE — 10160 PNXR ASPIR ABSC HMTMA BULLA: CPT | Performed by: PHYSICIAN ASSISTANT

## 2024-07-17 PROCEDURE — 99283 EMERGENCY DEPT VISIT LOW MDM: CPT | Mod: 25 | Performed by: PHYSICIAN ASSISTANT

## 2024-07-17 PROCEDURE — 73140 X-RAY EXAM OF FINGER(S): CPT | Mod: RT

## 2024-07-17 PROCEDURE — 250N000013 HC RX MED GY IP 250 OP 250 PS 637: Performed by: STUDENT IN AN ORGANIZED HEALTH CARE EDUCATION/TRAINING PROGRAM

## 2024-07-17 PROCEDURE — 99282 EMERGENCY DEPT VISIT SF MDM: CPT | Performed by: STUDENT IN AN ORGANIZED HEALTH CARE EDUCATION/TRAINING PROGRAM

## 2024-07-17 RX ORDER — BUPIVACAINE HYDROCHLORIDE 2.5 MG/ML
10 INJECTION, SOLUTION EPIDURAL; INFILTRATION; INTRACAUDAL ONCE
Status: COMPLETED | OUTPATIENT
Start: 2024-07-17 | End: 2024-07-17

## 2024-07-17 RX ORDER — ACETAMINOPHEN 325 MG/1
975 TABLET ORAL ONCE
Status: COMPLETED | OUTPATIENT
Start: 2024-07-17 | End: 2024-07-17

## 2024-07-17 RX ADMIN — ACETAMINOPHEN 975 MG: 325 TABLET ORAL at 22:31

## 2024-07-17 RX ADMIN — IBUPROFEN 600 MG: 200 TABLET, FILM COATED ORAL at 22:31

## 2024-07-17 RX ADMIN — BUPIVACAINE HYDROCHLORIDE 25 MG: 2.5 INJECTION, SOLUTION EPIDURAL; INFILTRATION; INTRACAUDAL; PERINEURAL at 22:39

## 2024-07-17 ASSESSMENT — ACTIVITIES OF DAILY LIVING (ADL)
ADLS_ACUITY_SCORE: 35
ADLS_ACUITY_SCORE: 33

## 2024-07-18 RX ORDER — DOXYCYCLINE 100 MG/1
100 CAPSULE ORAL 2 TIMES DAILY
Qty: 14 CAPSULE | Refills: 0 | Status: SHIPPED | OUTPATIENT
Start: 2024-07-18 | End: 2024-07-25

## 2024-07-18 NOTE — ED PROVIDER NOTES
[facility]    PROCEDURE: -Incision/Drainage    Date/Time: 7/17/2024 10:58 PM    Performed by: Marty Bray PA-C  Authorized by: Marty Bray PA-C    Risks, benefits and alternatives discussed.      UNIVERSAL PROTOCOL   Site Marked: Yes  Prior Images Obtained and Reviewed:  NA  Required items: Required blood products, implants, devices and special equipment available    Patient identity confirmed:  Verbally with patient  NA - No sedation, light sedation, or local anesthesia  Confirmation Checklist:  Patient's identity using two indicators and relevant allergies  Time out: Immediately prior to the procedure a time out was called    Universal Protocol: the Joint Commission Universal Protocol was followed    Preparation: Patient was prepped and draped in usual sterile fashion      LOCATION:      Type:  Abscess    Location: Right thumb.    PRE-PROCEDURE DETAILS:     Skin preparation:  Hibiclens    PROCEDURE TYPE:     Complexity:  Simple    ANESTHESIA (see MAR for exact dosages):     Anesthesia method: Digital block of the right thumb was conducted using 4 cc of 0.25% Marcaine with good local anesthesia obtained.    PROCEDURE DETAILS:     Incision types:  Stab incision    Incision depth:  Dermal    Scalpel blade:  11    Wound management:  Probed and deloculated    Drainage:  Purulent    Drainage amount:  Scant    Wound treatment:  Wound left open    Packing materials:  None    PROCEDURE    Patient Tolerance:  Patient tolerated the procedure well with no immediate complications   With a right thumb paronychia.  Abscess drained using a #11 blade scalpel.  Patient tolerated procedure well fall complications.  Dressing applied.         Marty Bray PA-C  07/17/24 3778

## 2024-07-18 NOTE — ED NOTES
Chart opened due to pt left prior to receiving prescription and AVS. Writer called pt, she plans to return in the morning to .     Vi SAMUEL RN 7/18/24 at 0125

## 2024-07-18 NOTE — ED PROVIDER NOTES
"Emergency Department Provider Note  : 2000 Age: 23 year old Sex: female MRN: [id number]    Chief Complaint   Patient presents with    Finger       Medical Decision Making / Assessment / Plan   23 year old female presenting with swelling and erythema of right thumb. X-ray negative for osteomyelitis or fracture. I&D by Rio Bray with successful drainage. Prescribed antibiotics, patient left prior to picking up prescription and script sent to  for patient .        The patient's evaluation involved:  ordering and/or review of 1 test(s) in this encounter (X-ray)    Discharge Medication List as of 2024 12:05 AM        START taking these medications    Details   doxycycline monohydrate (MONODOX) 100 MG capsule Take 1 capsule (100 mg) by mouth 2 times daily for 7 days, Disp-14 capsule, R-0, [facility]           Final diagnoses:   Acute paronychia of thumb, right       Kaushik Zelaya MD  2024   Emergency Department    Subjective   Amy is a 23 year old female who presents at  9:42 PM with thumb infection, states she sucks her thumb while asleep and it has become swollen and painful.    I have reviewed the Medications, Allergies, Past Medical and Surgical History, and Social History in the [facility] and with family.    Review of Systems:  Please see Subjective / HPI for pertinent positives and negatives. All other systems reviewed and found to be negative.      Objective   Patient Vitals for the past 24 hrs:   BP Temp Temp src Pulse Resp SpO2 Height Weight   24 2137 (!) 147/112 100  F (37.8  C) Tympanic 120 18 99 % 1.702 m (5' 7\") 77.1 kg (170 lb)   Physical Exam:     General: Awake, alert, in no acute respiratory distress.  Head: Normocephalic, atraumatic.  Eyes: No conjunctival injection and normal lids. PERRL, EOMI.  ENT: Moist membranes, external ear appears normal.   Chest/Respiratory: Unlabored respiratory effort. Equal chest rise.  Cardiovascular: Peripheral pulses " present.  Abdominal: Soft, non-distended, non-tender.  MSK: Swollen right thumb with area of swelling contiguous to nail.  Neurological: GCS 15, moving all extremities without gross deficit.  Skin: Warm, no rashes, lesions, or bruising.   Psychiatric: Appropriate affect.    Procedures / Critical Care   Procedures  Orders Placed This Encounter   Procedures    Incision and drainage    XR Finger Right G/E 2 Views     RESULTS: As noted above.      Medical/Surgical History:  No past medical history on file.  No past surgical history on file.    Medications:  No current facility-administered medications for this encounter.     Current Outpatient Medications   Medication Sig Dispense Refill    doxycycline monohydrate (MONODOX) 100 MG capsule Take 1 capsule (100 mg) by mouth 2 times daily for 7 days 14 capsule 0    etonogestrel (NEXPLANON) 68 MG IMPL Inject 1 each Subcutaneous      ibuprofen (ADVIL/MOTRIN) 800 MG tablet Take 1 tablet (800 mg) by mouth every 8 hours as needed for moderate pain 30 tablet 0    levothyroxine (SYNTHROID/LEVOTHROID) 88 MCG tablet Take 1 tablet (88 mcg) by mouth daily for 90 days 90 tablet 0    medical cannabis (Patient's own supply) Mar Message Only. Treatment with medical cannabis began on end of 2021.  Dosage form used is  liquid oil for other medical condition or treatment approved by the [place] commissioner of health.       Allergies:  Acetaminophen and Aspirin    Nursing notes were reviewed.  Past medical history, past surgical history, problem list, family history, social history, medication list, and allergies were reviewed as documented in epic snapshot. Relevant review of systems are documented within the HPI above.       Kaushik Zelaya MD  07/18/24 0419

## 2024-07-18 NOTE — ED TRIAGE NOTES
"Pt presents to triage with concern of infection to the right thumb. This morning started as a hangnail, attempted to clean by soaking the finger with epsom salts, cleansed with peroxide and applied antibiotic ointment and bandage. Pt reports increasing pain and swelling to the finger over the day, now is worried about removing bandage due to the pain. Also reports concern of possible kidney infection.     BP (!) 147/112   Pulse 120   Temp 100  F (37.8  C) (Tympanic)   Resp 18   Ht 1.702 m (5' 7\")   Wt 77.1 kg (170 lb)   SpO2 99%   BMI 26.63 kg/m         Triage Assessment (Adult)       Row Name 07/17/24 1563          Triage Assessment    Airway WDL WDL        Respiratory WDL    Respiratory WDL WDL        Skin Circulation/Temperature WDL    Skin Circulation/Temperature WDL WDL        Cardiac WDL    Cardiac WDL WDL        Peripheral/Neurovascular WDL    Peripheral Neurovascular WDL WDL        Cognitive/Neuro/Behavioral WDL    Cognitive/Neuro/Behavioral WDL WDL                     "

## 2024-07-18 NOTE — DISCHARGE INSTRUCTIONS
Thank you for giving us the opportunity to see you. The impression is that you likely have a paronychia of your finger which is an infection.     Take Doxycycline antibiotic to treat the infection, remain upright for 30 minutes after taking each dose to reduce risk of stomach irritation.    Since you received an opiate pain medication or sedative during your visit, please do not drive for at least 8 hours.     If you had lab work, cultures or imaging studies done during your stay, the final results may still be pending. We will call you if your plan of care needs to change.     After discharge, please closely monitor for any new or worsening symptoms. Return to the Emergency Department at any time if your symptoms worsen.

## (undated) RX ORDER — ONDANSETRON 4 MG/1
TABLET, ORALLY DISINTEGRATING ORAL
Status: DISPENSED
Start: 2022-11-06

## (undated) RX ORDER — ACETAMINOPHEN 325 MG/1
TABLET ORAL
Status: DISPENSED
Start: 2024-07-17

## (undated) RX ORDER — IBUPROFEN 400 MG/1
TABLET, FILM COATED ORAL
Status: DISPENSED
Start: 2021-04-19

## (undated) RX ORDER — OXYCODONE HYDROCHLORIDE 5 MG/1
TABLET ORAL
Status: DISPENSED
Start: 2021-03-24

## (undated) RX ORDER — ONDANSETRON 2 MG/ML
INJECTION INTRAMUSCULAR; INTRAVENOUS
Status: DISPENSED
Start: 2022-11-06

## (undated) RX ORDER — CIPROFLOXACIN 500 MG/1
TABLET, FILM COATED ORAL
Status: DISPENSED
Start: 2022-11-04

## (undated) RX ORDER — KETOROLAC TROMETHAMINE 15 MG/ML
INJECTION, SOLUTION INTRAMUSCULAR; INTRAVENOUS
Status: DISPENSED
Start: 2020-07-02

## (undated) RX ORDER — IBUPROFEN 400 MG/1
TABLET, FILM COATED ORAL
Status: DISPENSED
Start: 2024-07-17

## (undated) RX ORDER — LIDOCAINE HYDROCHLORIDE 10 MG/ML
INJECTION, SOLUTION EPIDURAL; INFILTRATION; INTRACAUDAL; PERINEURAL
Status: DISPENSED
Start: 2024-07-17

## (undated) RX ORDER — IBUPROFEN 400 MG/1
TABLET, FILM COATED ORAL
Status: DISPENSED
Start: 2022-11-06

## (undated) RX ORDER — KETOROLAC TROMETHAMINE 30 MG/ML
INJECTION, SOLUTION INTRAMUSCULAR; INTRAVENOUS
Status: DISPENSED
Start: 2022-11-04

## (undated) RX ORDER — MORPHINE SULFATE 4 MG/ML
INJECTION, SOLUTION INTRAMUSCULAR; INTRAVENOUS
Status: DISPENSED
Start: 2022-11-06

## (undated) RX ORDER — IBUPROFEN 200 MG
TABLET ORAL
Status: DISPENSED
Start: 2024-07-17

## (undated) RX ORDER — SODIUM CHLORIDE 9 MG/ML
INJECTION, SOLUTION INTRAVENOUS
Status: DISPENSED
Start: 2020-07-02

## (undated) RX ORDER — CEFTRIAXONE SODIUM 1 G/50ML
INJECTION, SOLUTION INTRAVENOUS
Status: DISPENSED
Start: 2022-11-06

## (undated) RX ORDER — CIPROFLOXACIN 2 MG/ML
INJECTION, SOLUTION INTRAVENOUS
Status: DISPENSED
Start: 2022-11-06

## (undated) RX ORDER — BUPIVACAINE HYDROCHLORIDE 2.5 MG/ML
INJECTION, SOLUTION EPIDURAL; INFILTRATION; INTRACAUDAL
Status: DISPENSED
Start: 2024-07-17

## (undated) RX ORDER — LIDOCAINE HYDROCHLORIDE 10 MG/ML
INJECTION, SOLUTION EPIDURAL; INFILTRATION; INTRACAUDAL; PERINEURAL
Status: DISPENSED
Start: 2021-03-24

## (undated) RX ORDER — CEFTRIAXONE SODIUM 1 G/50ML
INJECTION, SOLUTION INTRAVENOUS
Status: DISPENSED
Start: 2020-07-02

## (undated) RX ORDER — ONDANSETRON 2 MG/ML
INJECTION INTRAMUSCULAR; INTRAVENOUS
Status: DISPENSED
Start: 2022-11-04

## (undated) RX ORDER — ONDANSETRON 2 MG/ML
INJECTION INTRAMUSCULAR; INTRAVENOUS
Status: DISPENSED
Start: 2020-07-02

## (undated) RX ORDER — IBUPROFEN 200 MG
TABLET ORAL
Status: DISPENSED
Start: 2022-11-06